# Patient Record
Sex: MALE | Race: WHITE | URBAN - METROPOLITAN AREA
[De-identification: names, ages, dates, MRNs, and addresses within clinical notes are randomized per-mention and may not be internally consistent; named-entity substitution may affect disease eponyms.]

---

## 2017-05-15 ENCOUNTER — HOSPITAL ENCOUNTER (INPATIENT)
Facility: CLINIC | Age: 67
LOS: 7 days | Discharge: HOME | DRG: 871 | End: 2017-05-22
Attending: FAMILY MEDICINE | Admitting: INTERNAL MEDICINE
Payer: MEDICARE

## 2017-05-15 ENCOUNTER — APPOINTMENT (OUTPATIENT)
Dept: GENERAL RADIOLOGY | Facility: CLINIC | Age: 67
DRG: 871 | End: 2017-05-15
Attending: FAMILY MEDICINE
Payer: MEDICARE

## 2017-05-15 DIAGNOSIS — E66.01 MORBID OBESITY, UNSPECIFIED OBESITY TYPE (H): ICD-10-CM

## 2017-05-15 DIAGNOSIS — R78.81 STREPTOCOCCAL BACTEREMIA: ICD-10-CM

## 2017-05-15 DIAGNOSIS — L03.115 CELLULITIS OF RIGHT LOWER EXTREMITY: ICD-10-CM

## 2017-05-15 DIAGNOSIS — B95.5 STREPTOCOCCAL BACTEREMIA: ICD-10-CM

## 2017-05-15 DIAGNOSIS — R65.20 SEVERE SEPSIS WITH ACUTE ORGAN DYSFUNCTION DUE TO STREPTOCOCCUS SPECIES (H): Primary | ICD-10-CM

## 2017-05-15 DIAGNOSIS — A41.9 BACTERIAL SEPSIS (H): ICD-10-CM

## 2017-05-15 DIAGNOSIS — I50.9 ACUTE ON CHRONIC CONGESTIVE HEART FAILURE, UNSPECIFIED CONGESTIVE HEART FAILURE TYPE: ICD-10-CM

## 2017-05-15 DIAGNOSIS — I48.91 ATRIAL FIBRILLATION WITH RAPID VENTRICULAR RESPONSE (H): ICD-10-CM

## 2017-05-15 DIAGNOSIS — A40.9 SEVERE SEPSIS WITH ACUTE ORGAN DYSFUNCTION DUE TO STREPTOCOCCUS SPECIES (H): Primary | ICD-10-CM

## 2017-05-15 PROBLEM — I50.42 CHRONIC COMBINED SYSTOLIC AND DIASTOLIC CONGESTIVE HEART FAILURE (H): Status: ACTIVE | Noted: 2017-05-15

## 2017-05-15 PROBLEM — I42.9 CARDIOMYOPATHY, UNSPECIFIED (H): Status: ACTIVE | Noted: 2017-05-15

## 2017-05-15 PROBLEM — I27.20 PULMONARY HYPERTENSION (H): Status: ACTIVE | Noted: 2017-05-15

## 2017-05-15 PROBLEM — G89.4 CHRONIC PAIN SYNDROME: Status: ACTIVE | Noted: 2017-05-15

## 2017-05-15 PROBLEM — L03.116 LEFT LEG CELLULITIS: Status: ACTIVE | Noted: 2017-05-15

## 2017-05-15 PROBLEM — J96.01 ACUTE RESPIRATORY FAILURE WITH HYPOXIA (H): Status: ACTIVE | Noted: 2017-05-15

## 2017-05-15 PROBLEM — E11.9 TYPE 2 DIABETES MELLITUS WITHOUT COMPLICATION (H): Status: ACTIVE | Noted: 2017-05-15

## 2017-05-15 PROBLEM — I10 ESSENTIAL HYPERTENSION: Status: ACTIVE | Noted: 2017-05-15

## 2017-05-15 PROBLEM — G93.40 ACUTE ENCEPHALOPATHY: Status: ACTIVE | Noted: 2017-05-15

## 2017-05-15 PROBLEM — G47.33 OSA (OBSTRUCTIVE SLEEP APNEA): Status: ACTIVE | Noted: 2017-05-15

## 2017-05-15 LAB
ALBUMIN SERPL-MCNC: 3.8 G/DL (ref 3.4–5)
ALBUMIN UR-MCNC: 30 MG/DL
ALP SERPL-CCNC: 111 U/L (ref 40–150)
ALT SERPL W P-5'-P-CCNC: 51 U/L (ref 0–70)
ANION GAP SERPL CALCULATED.3IONS-SCNC: 7 MMOL/L (ref 3–14)
APPEARANCE UR: CLEAR
AST SERPL W P-5'-P-CCNC: 40 U/L (ref 0–45)
BASE EXCESS BLDA CALC-SCNC: 1.1 MMOL/L
BASOPHILS # BLD AUTO: 0 10E9/L (ref 0–0.2)
BASOPHILS NFR BLD AUTO: 0.1 %
BILIRUB SERPL-MCNC: 0.8 MG/DL (ref 0.2–1.3)
BILIRUB UR QL STRIP: NEGATIVE
BUN SERPL-MCNC: 22 MG/DL (ref 7–30)
CALCIUM SERPL-MCNC: 8.8 MG/DL (ref 8.5–10.1)
CHLORIDE SERPL-SCNC: 103 MMOL/L (ref 94–109)
CO2 SERPL-SCNC: 30 MMOL/L (ref 20–32)
COLOR UR AUTO: YELLOW
CREAT SERPL-MCNC: 1.09 MG/DL (ref 0.66–1.25)
DIFFERENTIAL METHOD BLD: ABNORMAL
EOSINOPHIL # BLD AUTO: 0 10E9/L (ref 0–0.7)
EOSINOPHIL NFR BLD AUTO: 0 %
ERYTHROCYTE [DISTWIDTH] IN BLOOD BY AUTOMATED COUNT: 15.1 % (ref 10–15)
GFR SERPL CREATININE-BSD FRML MDRD: 68 ML/MIN/1.7M2
GLUCOSE BLDC GLUCOMTR-MCNC: 217 MG/DL (ref 70–99)
GLUCOSE SERPL-MCNC: 180 MG/DL (ref 70–99)
GLUCOSE UR STRIP-MCNC: 50 MG/DL
HCO3 BLD-SCNC: 25 MMOL/L (ref 21–28)
HCT VFR BLD AUTO: 49.1 % (ref 40–53)
HGB BLD-MCNC: 15.4 G/DL (ref 13.3–17.7)
HGB UR QL STRIP: ABNORMAL
IMM GRANULOCYTES # BLD: 0.1 10E9/L (ref 0–0.4)
IMM GRANULOCYTES NFR BLD: 0.5 %
INR PPP: 4.7 (ref 0.86–1.14)
KETONES UR STRIP-MCNC: NEGATIVE MG/DL
LACTATE BLD-SCNC: 2.1 MMOL/L (ref 0.7–2.1)
LACTATE BLD-SCNC: 3.1 MMOL/L (ref 0.7–2.1)
LACTATE BLD-SCNC: 3.3 MMOL/L (ref 0.7–2.1)
LEUKOCYTE ESTERASE UR QL STRIP: NEGATIVE
LYMPHOCYTES # BLD AUTO: 1.1 10E9/L (ref 0.8–5.3)
LYMPHOCYTES NFR BLD AUTO: 7.1 %
MCH RBC QN AUTO: 28.5 PG (ref 26.5–33)
MCHC RBC AUTO-ENTMCNC: 31.4 G/DL (ref 31.5–36.5)
MCV RBC AUTO: 91 FL (ref 78–100)
MONOCYTES # BLD AUTO: 1.5 10E9/L (ref 0–1.3)
MONOCYTES NFR BLD AUTO: 9.2 %
MUCOUS THREADS #/AREA URNS LPF: PRESENT /LPF
NEUTROPHILS # BLD AUTO: 13.4 10E9/L (ref 1.6–8.3)
NEUTROPHILS NFR BLD AUTO: 83.1 %
NITRATE UR QL: NEGATIVE
NT-PROBNP SERPL-MCNC: 2562 PG/ML (ref 0–900)
O2/TOTAL GAS SETTING VFR VENT: 75 %
OXYHGB MFR BLD: 99 % (ref 92–100)
PCO2 BLD: 35 MM HG (ref 35–45)
PH BLD: 7.46 PH (ref 7.35–7.45)
PH UR STRIP: 5 PH (ref 5–7)
PLATELET # BLD AUTO: 153 10E9/L (ref 150–450)
PO2 BLD: 174 MM HG (ref 80–105)
POTASSIUM SERPL-SCNC: 5 MMOL/L (ref 3.4–5.3)
PROT SERPL-MCNC: 7.5 G/DL (ref 6.8–8.8)
RBC # BLD AUTO: 5.4 10E12/L (ref 4.4–5.9)
RBC #/AREA URNS AUTO: 1 /HPF (ref 0–2)
SODIUM SERPL-SCNC: 140 MMOL/L (ref 133–144)
SP GR UR STRIP: 1.02 (ref 1–1.03)
URN SPEC COLLECT METH UR: ABNORMAL
UROBILINOGEN UR STRIP-MCNC: 0 MG/DL (ref 0–2)
WBC # BLD AUTO: 16.1 10E9/L (ref 4–11)
WBC #/AREA URNS AUTO: 0 /HPF (ref 0–2)

## 2017-05-15 PROCEDURE — 36415 COLL VENOUS BLD VENIPUNCTURE: CPT | Performed by: INTERNAL MEDICINE

## 2017-05-15 PROCEDURE — 87077 CULTURE AEROBIC IDENTIFY: CPT | Performed by: FAMILY MEDICINE

## 2017-05-15 PROCEDURE — 80053 COMPREHEN METABOLIC PANEL: CPT | Performed by: FAMILY MEDICINE

## 2017-05-15 PROCEDURE — 87040 BLOOD CULTURE FOR BACTERIA: CPT | Performed by: FAMILY MEDICINE

## 2017-05-15 PROCEDURE — 83605 ASSAY OF LACTIC ACID: CPT | Performed by: INTERNAL MEDICINE

## 2017-05-15 PROCEDURE — 81001 URINALYSIS AUTO W/SCOPE: CPT | Performed by: FAMILY MEDICINE

## 2017-05-15 PROCEDURE — 25000125 ZZHC RX 250

## 2017-05-15 PROCEDURE — 20000003 ZZH R&B ICU

## 2017-05-15 PROCEDURE — 71010 XR CHEST PORT 1 VW: CPT | Mod: TC

## 2017-05-15 PROCEDURE — 36600 WITHDRAWAL OF ARTERIAL BLOOD: CPT

## 2017-05-15 PROCEDURE — 87147 CULTURE TYPE IMMUNOLOGIC: CPT | Performed by: FAMILY MEDICINE

## 2017-05-15 PROCEDURE — 85025 COMPLETE CBC W/AUTO DIFF WBC: CPT | Performed by: FAMILY MEDICINE

## 2017-05-15 PROCEDURE — 25000128 H RX IP 250 OP 636: Performed by: FAMILY MEDICINE

## 2017-05-15 PROCEDURE — 85610 PROTHROMBIN TIME: CPT | Performed by: FAMILY MEDICINE

## 2017-05-15 PROCEDURE — 36415 COLL VENOUS BLD VENIPUNCTURE: CPT | Performed by: FAMILY MEDICINE

## 2017-05-15 PROCEDURE — 25000128 H RX IP 250 OP 636: Performed by: INTERNAL MEDICINE

## 2017-05-15 PROCEDURE — 25000125 ZZHC RX 250: Performed by: FAMILY MEDICINE

## 2017-05-15 PROCEDURE — 00000146 ZZHCL STATISTIC GLUCOSE BY METER IP

## 2017-05-15 PROCEDURE — 87186 SC STD MICRODIL/AGAR DIL: CPT | Performed by: FAMILY MEDICINE

## 2017-05-15 PROCEDURE — 99223 1ST HOSP IP/OBS HIGH 75: CPT | Mod: AI | Performed by: INTERNAL MEDICINE

## 2017-05-15 PROCEDURE — 40000275 ZZH STATISTIC RCP TIME EA 10 MIN

## 2017-05-15 PROCEDURE — 83605 ASSAY OF LACTIC ACID: CPT | Performed by: FAMILY MEDICINE

## 2017-05-15 PROCEDURE — 87081 CULTURE SCREEN ONLY: CPT | Performed by: INTERNAL MEDICINE

## 2017-05-15 PROCEDURE — 87800 DETECT AGNT MULT DNA DIREC: CPT | Performed by: FAMILY MEDICINE

## 2017-05-15 PROCEDURE — 99207 ZZC MOONLIGHTING INDICATOR: CPT | Performed by: INTERNAL MEDICINE

## 2017-05-15 PROCEDURE — 94660 CPAP INITIATION&MGMT: CPT

## 2017-05-15 PROCEDURE — 87086 URINE CULTURE/COLONY COUNT: CPT | Performed by: FAMILY MEDICINE

## 2017-05-15 PROCEDURE — 82805 BLOOD GASES W/O2 SATURATION: CPT | Performed by: FAMILY MEDICINE

## 2017-05-15 PROCEDURE — 83880 ASSAY OF NATRIURETIC PEPTIDE: CPT | Performed by: FAMILY MEDICINE

## 2017-05-15 PROCEDURE — 25000128 H RX IP 250 OP 636

## 2017-05-15 RX ORDER — ONDANSETRON 2 MG/ML
4 INJECTION INTRAMUSCULAR; INTRAVENOUS EVERY 6 HOURS PRN
Status: DISCONTINUED | OUTPATIENT
Start: 2017-05-15 | End: 2017-05-22 | Stop reason: HOSPADM

## 2017-05-15 RX ORDER — DILTIAZEM HYDROCHLORIDE 5 MG/ML
INJECTION INTRAVENOUS
Status: COMPLETED
Start: 2017-05-15 | End: 2017-05-15

## 2017-05-15 RX ORDER — SODIUM CHLORIDE 9 MG/ML
INJECTION, SOLUTION INTRAVENOUS CONTINUOUS
Status: DISCONTINUED | OUTPATIENT
Start: 2017-05-15 | End: 2017-05-18

## 2017-05-15 RX ORDER — POLYETHYLENE GLYCOL 3350 17 G/17G
17 POWDER, FOR SOLUTION ORAL DAILY PRN
Status: DISCONTINUED | OUTPATIENT
Start: 2017-05-15 | End: 2017-05-22 | Stop reason: HOSPADM

## 2017-05-15 RX ORDER — ASPIRIN 81 MG/1
81 TABLET, CHEWABLE ORAL DAILY
Status: DISCONTINUED | OUTPATIENT
Start: 2017-05-16 | End: 2017-05-22 | Stop reason: HOSPADM

## 2017-05-15 RX ORDER — NICOTINE POLACRILEX 4 MG
15-30 LOZENGE BUCCAL
Status: DISCONTINUED | OUTPATIENT
Start: 2017-05-15 | End: 2017-05-22 | Stop reason: HOSPADM

## 2017-05-15 RX ORDER — ACETAMINOPHEN 500 MG
1000 TABLET ORAL EVERY 4 HOURS PRN
Status: DISCONTINUED | OUTPATIENT
Start: 2017-05-15 | End: 2017-05-15

## 2017-05-15 RX ORDER — HYDROCODONE BITARTRATE AND ACETAMINOPHEN 5; 325 MG/1; MG/1
2 TABLET ORAL EVERY 6 HOURS PRN
Status: ON HOLD | COMMUNITY
End: 2017-05-22

## 2017-05-15 RX ORDER — ACETAMINOPHEN 10 MG/ML
1000 INJECTION, SOLUTION INTRAVENOUS EVERY 6 HOURS PRN
Status: DISCONTINUED | OUTPATIENT
Start: 2017-05-15 | End: 2017-05-15

## 2017-05-15 RX ORDER — PROCHLORPERAZINE 25 MG
12.5 SUPPOSITORY, RECTAL RECTAL EVERY 12 HOURS PRN
Status: DISCONTINUED | OUTPATIENT
Start: 2017-05-15 | End: 2017-05-22 | Stop reason: HOSPADM

## 2017-05-15 RX ORDER — SODIUM CHLORIDE 9 MG/ML
100 INJECTION, SOLUTION INTRAVENOUS CONTINUOUS
Status: DISCONTINUED | OUTPATIENT
Start: 2017-05-15 | End: 2017-05-15

## 2017-05-15 RX ORDER — HYDROCODONE BITARTRATE AND ACETAMINOPHEN 5; 325 MG/1; MG/1
2 TABLET ORAL EVERY 6 HOURS PRN
Status: DISCONTINUED | OUTPATIENT
Start: 2017-05-15 | End: 2017-05-20

## 2017-05-15 RX ORDER — ACETAMINOPHEN 325 MG/1
650 TABLET ORAL EVERY 4 HOURS PRN
Status: DISCONTINUED | OUTPATIENT
Start: 2017-05-15 | End: 2017-05-22 | Stop reason: HOSPADM

## 2017-05-15 RX ORDER — NALOXONE HYDROCHLORIDE 0.4 MG/ML
.1-.4 INJECTION, SOLUTION INTRAMUSCULAR; INTRAVENOUS; SUBCUTANEOUS
Status: DISCONTINUED | OUTPATIENT
Start: 2017-05-15 | End: 2017-05-22 | Stop reason: HOSPADM

## 2017-05-15 RX ORDER — DILTIAZEM HYDROCHLORIDE 5 MG/ML
25 INJECTION INTRAVENOUS ONCE
Status: COMPLETED | OUTPATIENT
Start: 2017-05-15 | End: 2017-05-15

## 2017-05-15 RX ORDER — KETOROLAC TROMETHAMINE 15 MG/ML
15 INJECTION, SOLUTION INTRAMUSCULAR; INTRAVENOUS EVERY 6 HOURS PRN
Status: DISCONTINUED | OUTPATIENT
Start: 2017-05-15 | End: 2017-05-15

## 2017-05-15 RX ORDER — FENTANYL CITRATE 50 UG/ML
75 INJECTION, SOLUTION INTRAMUSCULAR; INTRAVENOUS
Status: DISCONTINUED | OUTPATIENT
Start: 2017-05-15 | End: 2017-05-15

## 2017-05-15 RX ORDER — ONDANSETRON 4 MG/1
4 TABLET, ORALLY DISINTEGRATING ORAL EVERY 6 HOURS PRN
Status: DISCONTINUED | OUTPATIENT
Start: 2017-05-15 | End: 2017-05-22 | Stop reason: HOSPADM

## 2017-05-15 RX ORDER — PROCHLORPERAZINE MALEATE 5 MG
5 TABLET ORAL EVERY 6 HOURS PRN
Status: DISCONTINUED | OUTPATIENT
Start: 2017-05-15 | End: 2017-05-22 | Stop reason: HOSPADM

## 2017-05-15 RX ORDER — LIDOCAINE 40 MG/G
CREAM TOPICAL
Status: DISCONTINUED | OUTPATIENT
Start: 2017-05-15 | End: 2017-05-15

## 2017-05-15 RX ORDER — FUROSEMIDE 10 MG/ML
40 INJECTION INTRAMUSCULAR; INTRAVENOUS ONCE
Status: COMPLETED | OUTPATIENT
Start: 2017-05-15 | End: 2017-05-15

## 2017-05-15 RX ORDER — DEXTROSE MONOHYDRATE 25 G/50ML
25-50 INJECTION, SOLUTION INTRAVENOUS
Status: DISCONTINUED | OUTPATIENT
Start: 2017-05-15 | End: 2017-05-22 | Stop reason: HOSPADM

## 2017-05-15 RX ORDER — DILTIAZEM HYDROCHLORIDE 5 MG/ML
25 INJECTION INTRAVENOUS ONCE
Status: DISCONTINUED | OUTPATIENT
Start: 2017-05-15 | End: 2017-05-15

## 2017-05-15 RX ADMIN — ACETAMINOPHEN 1000 MG: 10 INJECTION, SOLUTION INTRAVENOUS at 15:34

## 2017-05-15 RX ADMIN — VANCOMYCIN HYDROCHLORIDE 2500 MG: 1 INJECTION, POWDER, LYOPHILIZED, FOR SOLUTION INTRAVENOUS at 14:19

## 2017-05-15 RX ADMIN — SODIUM CHLORIDE: 9 INJECTION, SOLUTION INTRAVENOUS at 19:44

## 2017-05-15 RX ADMIN — SODIUM CHLORIDE 2000 ML: 9 INJECTION, SOLUTION INTRAVENOUS at 13:16

## 2017-05-15 RX ADMIN — DILTIAZEM HYDROCHLORIDE 25 MG: 5 INJECTION INTRAVENOUS at 13:03

## 2017-05-15 RX ADMIN — FENTANYL CITRATE 75 MCG: 50 INJECTION, SOLUTION INTRAMUSCULAR; INTRAVENOUS at 15:19

## 2017-05-15 RX ADMIN — SODIUM CHLORIDE 1000 ML: 9 INJECTION, SOLUTION INTRAVENOUS at 19:44

## 2017-05-15 RX ADMIN — FENTANYL CITRATE 75 MCG: 50 INJECTION, SOLUTION INTRAMUSCULAR; INTRAVENOUS at 13:54

## 2017-05-15 RX ADMIN — TAZOBACTAM SODIUM AND PIPERACILLIN SODIUM 4.5 G: 500; 4 INJECTION, SOLUTION INTRAVENOUS at 21:26

## 2017-05-15 RX ADMIN — KETOROLAC TROMETHAMINE 15 MG: 15 INJECTION, SOLUTION INTRAMUSCULAR; INTRAVENOUS at 15:51

## 2017-05-15 RX ADMIN — FUROSEMIDE 40 MG: 10 INJECTION, SOLUTION INTRAVENOUS at 14:19

## 2017-05-15 RX ADMIN — FENTANYL CITRATE 75 MCG: 50 INJECTION, SOLUTION INTRAMUSCULAR; INTRAVENOUS at 13:06

## 2017-05-15 RX ADMIN — SODIUM CHLORIDE 100 MG: 9 INJECTION, SOLUTION INTRAVENOUS at 19:56

## 2017-05-15 RX ADMIN — SODIUM CHLORIDE 1000 ML: 9 INJECTION, SOLUTION INTRAVENOUS at 12:43

## 2017-05-15 RX ADMIN — SODIUM CHLORIDE 1000 ML: 9 INJECTION, SOLUTION INTRAVENOUS at 15:28

## 2017-05-15 RX ADMIN — TAZOBACTAM SODIUM AND PIPERACILLIN SODIUM 4.5 G: 500; 4 INJECTION, SOLUTION INTRAVENOUS at 13:54

## 2017-05-15 NOTE — PHARMACY-VANCOMYCIN DOSING SERVICE
Pharmacy Vancomycin Initial Note  Date of Service May 15, 2017  Patient's  1950  66 year old, male    Indication: Sepsis    Current estimated CrCl = Estimated Creatinine Clearance: 94.8 mL/min (based on Cr of 1.09).    Creatinine for last 3 days  5/15/2017: 12:50 PM Creatinine 1.09 mg/dL    Recent Vancomycin Level(s) for last 3 days  No results found for requested labs within last 72 hours.      Vancomycin IV Administrations (past 72 hours)      No vancomycin orders with administrations in past 72 hours.                Nephrotoxins and other renal medications (Future)    Start     Dose/Rate Route Frequency Ordered Stop    05/15/17 1400  vancomycin (VANCOCIN) 2,500 mg in NaCl 0.9 % 500 mL intermittent infusion      2,500 mg  166.7 mL/hr over 180 Minutes Intravenous EVERY 12 HOURS 05/15/17 1357      05/15/17 1341  piperacillin-tazobactam (ZOSYN) intermittent infusion 4.5 g      4.5 g  200 mL/hr over 30 Minutes Intravenous ONCE 05/15/17 1340            Contrast Orders - past 72 hours     None                Plan:  1.  Start vancomycin  2500 mg IV q12h.   2.  Goal Trough Level: 15-20 mg/L   3.  Pharmacy will check trough levels as appropriate in 1-3 Days.    4. Serum creatinine levels will be ordered daily for the first week of therapy and at least twice weekly for subsequent weeks.    5. Lequire method utilized to dose vancomycin therapy: Method 2    Leelee Lopez, YennyD

## 2017-05-15 NOTE — ED NOTES
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 Pt wasn't feeling well at work CNS black topping. Bystanders alerted police that he was weaving all over the road. Pt went to [employer] then police met him. EMS called. RT was called. Bipap placed for tachypnea and low sats of 80 percent. Pt warm to touch. Note right  leg reddened and swollen. 's 130's a.fib. Cardizem given. HR now 70's to 80's. Pt is in A.fib at all times. Pt given fentanyl for agitation. Pt is following commands at all times. Pt confused initially.Did not remember driving today. Temp 103.3 Temporal. BP elevated significantly at triage. Now normal. RT managing bi pap. Palomino in place. Family here. Pt primary MD at [place]. Pt has history of hypertension.

## 2017-05-15 NOTE — IP AVS SNAPSHOT
[address] [facility]    [address] [facility]     [place] [address]    Phone:  [phone]                                       After Visit Summary   5/15/2017    Last Phelps    MRN: [id number]           After Visit Summary Signature Page     I have received my discharge instructions, and my questions have been answered. I have discussed any challenges I see with this plan with the nurse or doctor.    ..........................................................................................................................................  Patient/Patient Representative Signature      ..........................................................................................................................................  Patient Representative Print Name and Relationship to Patient    ..................................................               ................................................  Date                                            Time    ..........................................................................................................................................  Reviewed by Signature/Title    ...................................................              ..............................................  Date                                                            Time

## 2017-05-15 NOTE — ED PROVIDER NOTES
"  History     Chief Complaint   Patient presents with     Altered Mental Status     Shortness of breath. Driving erratic. Police pulled over.      HPI  Last Phelps is a 66 year old male arrived by ambulance.  He was pulled over by law enforcement here in [place] for erratic driving.  He doesn't remember driving or any recent events.  The patient is unable to give me any history.  He is confused.     Per EMS: EMS arrived and he had oxygen saturation of 85% on room air and had an increased respiratory rate.  His heart rate was 120 bpm.  Blood sugar was 215 and the patient denies a history of diabetes.  He stated that he did have recent heart surgery but was unable to tell them what that was.  He had a short trip to the emergency department and they had him on oxygen at 3 L by nasal cannula with oxygen saturations of around 90-92%.    I was able to speak to his wife on the phone.  The patient does have persistent lower extremity edema, right more than left.  She has not noticed any redness of his lower legs.  He has no history of blood clot.  He currently is on Coumadin for atrial fibrillation and recently had his INR checked and they were told that that was okay.  He has had a cardiac ablation but they did not give him a pacemaker.  He has no history of MI.  Patient has no known history of lung disease and is not a smoker.    I have reviewed the Medications, Allergies, Past Medical and Surgical History, and Social History in the Epic system.    Review of Systems   Unable to perform ROS: Acuity of condition       Physical Exam   BP: (!) 139/109  Pulse: 125  Heart Rate: 112  Temp: 103.3  F (39.6  C)  Resp: 24  Height: 185.4 cm (6' 1\")  Weight: 131.5 kg (290 lb)  SpO2: 91 %    Physical Exam   Constitutional: He appears distressed.   Morbidly obese 66-year-old male in severe respiratory distress   HENT:   Head: Normocephalic and atraumatic.   Right Ear: External ear normal.   Left Ear: External ear normal.   Eyes: " Conjunctivae and EOM are normal.   Neck: Normal range of motion. Neck supple. No JVD present.   Cardiovascular:   Elevated heart rate with irregularly irregular rhythm   Pulmonary/Chest: He is in respiratory distress. He has wheezes. He has rales.   Significantly increased work of breathing with tachypnea   Abdominal: Soft. Bowel sounds are normal. He exhibits distension. There is no tenderness. There is no rebound and no guarding.   Genitourinary: Penis normal.   Genitourinary Comments: Palomino passed without difficulty   Musculoskeletal: He exhibits edema. He exhibits no tenderness or deformity.   Lymphadenopathy:     He has no cervical adenopathy.   Neurological:   Confused and unable to answer questions reliably   Skin:   Significant erythema and warmth of the right lower extremity       ED Course  Last arrived in significant respiratory distress.  The patient is unable to give me any history.  He is confused.  He does appear to remain conscious but has low oxygen saturations.  He has significant erythema and swelling of the right lower leg.  We moved him to an oxygen mask at 1235 because of oxygen saturations of 87% on nasal cannula.  His rhythm shows atrial fibrillation with a rate in the 120s.  We then moved him to BiPAP with settings of 18/10/73% at about 12:50 PM.  The patient was given 25 mg of diltiazem at 1300.  Palomino was placed at 1301 and had return of urine.  Labs were drawn and results are pending.    1:43 PM  Last is more stable.  At this point he is on BiPAP with settings of 18/10/73%.  His vital signs right now show tachypnea with a respiratory rate of about 40.  Oxygen saturation is 99%.  Heart rate is 94.  Last blood pressure at 1345 was 121/104.   I did review the chest x-ray and he appears to have significant congestion.  Pneumonia is certainly a possibility.  At this point we started Zosyn and vancomycin for infection of unknown source.  It seems most likely that he has sepsis associated with  right lower extremity cellulitis.    2:58 PM  His BNP is mildly elevated at 2562.  His EKG shows atrial fibrillation with nonspecific ST segment changes.  Chest x-ray shows congestion.  We did give him a dose of Lasix here in the ED and currently his heart rate is 105 bpm with atrial fibrillation, respiratory rate is 31, oxygen saturation is 96% and blood pressure is 126/70.  His ventilator settings are 16/10/50% and he has a tidal volume of 439 mL.  I think he has sepsis from his cellulitis and we do have antibiotics started.  The patient appears much more stable clinically.       ED Course     Procedures             EKG Interpretation:      Interpreted by Tremaine Omalley  Time reviewed: 1245  Symptoms at time of EKG: shortness of breath and tachycardia   Rhythm: atrial fibrillation - rapid  Rate: 120 bpm  Axis: Normal  Ectopy: none  Conduction: normal  ST Segments/ T Waves: Non-specific ST-T wave changes  Q Waves: none  Comparison to prior: Unchanged    Clinical Impression: Atrial fibrillation with RVR and nonspecific ST segment changes.        Critical Care time:  was 60 minutes for this patient excluding procedures.  The patient has signs of Severe Sepsis as evidenced by:    1. 2 SIRS criteria, AND  2. Suspected infection, AND   3. Organ dysfunction: Lactic Acid >2    Time severe sepsis diagnosis confirmed = 1315 as this was the time when Lactate resulted, and the level was >2      3 Hour Severe Sepsis Bundle Completion:  1. Initial Lactic Acid Result:   Recent Labs   Lab Test  05/15/17   1508  05/15/17   1250   LACT  3.3*  3.1*     2. Blood Cultures before Antibiotics: Yes  3. Broad Spectrum Antibiotics Administered: Yes     Anti-infectives (Future)    Start     Dose/Rate Route Frequency Ordered Stop    05/15/17 1400  vancomycin (VANCOCIN) 2,500 mg in NaCl 0.9 % 500 mL intermittent infusion      2,500 mg  166.7 mL/hr over 180 Minutes Intravenous EVERY 12 HOURS 05/15/17 1357          4. 3000 ml of IV  fluids.    the patient was transferred out of the ED prior to the 6 hour moira.        Results for orders placed or performed during the hospital encounter of 05/15/17 (from the past 24 hour(s))   Comprehensive metabolic panel   Result Value Ref Range    Sodium 140 133 - 144 mmol/L    Potassium 5.0 3.4 - 5.3 mmol/L    Chloride 103 94 - 109 mmol/L    Carbon Dioxide 30 20 - 32 mmol/L    Anion Gap 7 3 - 14 mmol/L    Glucose 180 (H) 70 - 99 mg/dL    Urea Nitrogen 22 7 - 30 mg/dL    Creatinine 1.09 0.66 - 1.25 mg/dL    GFR Estimate 68 >60 mL/min/1.7m2    GFR Estimate If Black 82 >60 mL/min/1.7m2    Calcium 8.8 8.5 - 10.1 mg/dL    Bilirubin Total 0.8 0.2 - 1.3 mg/dL    Albumin 3.8 3.4 - 5.0 g/dL    Protein Total 7.5 6.8 - 8.8 g/dL    Alkaline Phosphatase 111 40 - 150 U/L    ALT 51 0 - 70 U/L    AST 40 0 - 45 U/L   CBC with platelets differential   Result Value Ref Range    WBC 16.1 (H) 4.0 - 11.0 10e9/L    RBC Count 5.40 4.4 - 5.9 10e12/L    Hemoglobin 15.4 13.3 - 17.7 g/dL    Hematocrit 49.1 40.0 - 53.0 %    MCV 91 78 - 100 fl    MCH 28.5 26.5 - 33.0 pg    MCHC 31.4 (L) 31.5 - 36.5 g/dL    RDW 15.1 (H) 10.0 - 15.0 %    Platelet Count 153 150 - 450 10e9/L    Diff Method Automated Method     % Neutrophils 83.1 %    % Lymphocytes 7.1 %    % Monocytes 9.2 %    % Eosinophils 0.0 %    % Basophils 0.1 %    % Immature Granulocytes 0.5 %    Absolute Neutrophil 13.4 (H) 1.6 - 8.3 10e9/L    Absolute Lymphocytes 1.1 0.8 - 5.3 10e9/L    Absolute Monocytes 1.5 (H) 0.0 - 1.3 10e9/L    Absolute Eosinophils 0.0 0.0 - 0.7 10e9/L    Absolute Basophils 0.0 0.0 - 0.2 10e9/L    Abs Immature Granulocytes 0.1 0 - 0.4 10e9/L   Lactic acid whole blood   Result Value Ref Range    Lactic Acid 3.1 (H) 0.7 - 2.1 mmol/L   INR   Result Value Ref Range    INR 4.70 (H) 0.86 - 1.14   Blood gas arterial and oxyhgb   Result Value Ref Range    pH Arterial 7.46 (H) 7.35 - 7.45 pH    pCO2 Arterial 35 35 - 45 mm Hg    pO2 Arterial 174 (H) 80 - 105 mm Hg     Bicarbonate Arterial 25 21 - 28 mmol/L    FIO2 75     Oxyhemoglobin Arterial 99 92 - 100 %    Base Excess Art 1.1 mmol/L   NT pro BNP   Result Value Ref Range    N-Terminal Pro BNP Inpatient 2562 (H) 0 - 900 pg/mL   UA with Microscopic   Result Value Ref Range    Color Urine Yellow     Appearance Urine Clear     Glucose Urine 50 (A) NEG mg/dL    Bilirubin Urine Negative NEG    Ketones Urine Negative NEG mg/dL    Specific Gravity Urine 1.016 1.003 - 1.035    Blood Urine Small (A) NEG    pH Urine 5.0 5.0 - 7.0 pH    Protein Albumin Urine 30 (A) NEG mg/dL    Urobilinogen mg/dL 0.0 0.0 - 2.0 mg/dL    Nitrite Urine Negative NEG    Leukocyte Esterase Urine Negative NEG    Source Catheterized Urine     WBC Urine 0 0 - 2 /HPF    RBC Urine 1 0 - 2 /HPF    Mucous Urine Present (A) NEG /LPF   XR Chest Port 1 View    Narrative    CHEST ONE VIEW PORTABLE  5/15/2017 1:39 PM     HISTORY: Hypoxia, decreased level of consciousness.    COMPARISON: None.    FINDINGS:  Lungs are hypoaerated. Increased interstitial markings  likely represent vascular crowding and/or atelectasis. Vascular  congestion is considered less likely. Tubular structure projected over  the left upper chest likely represents the breathing apparatus. No  pneumothorax or significant pleural fluid collections are identified.  No fracture is seen.      Impression    IMPRESSION:  1. Hypoaeration and pulmonary vascular crowding.  2. No other evidence of acute cardiopulmonary disease is seen.    FAYE BOWERS MD   Lactic acid whole blood   Result Value Ref Range    Lactic Acid 3.3 (H) 0.7 - 2.1 mmol/L       Medications   lidocaine 1 % 1 mL (not administered)   lidocaine (LMX4) kit (not administered)   sodium chloride (PF) 0.9% PF flush 3 mL (not administered)   sodium chloride (PF) 0.9% PF flush 3 mL ( Intravenous Canceled Entry 5/15/17 1645)   fentaNYL Citrate (PF) (SUBLIMAZE) injection 75 mcg (75 mcg Intravenous Given 5/15/17 3799)   No lozenges or gum should be given  while patient on BIPAP (not administered)   hypromellose-dextran (ARTIFICAL TEARS) ophthalmic solution 1 drop (not administered)   HOLD: All Oral Medications (not administered)   vancomycin (VANCOCIN) 2,500 mg in NaCl 0.9 % 500 mL intermittent infusion (0 mg Intravenous Stopped 5/15/17 1758)   ketorolac (TORADOL) injection 15 mg (15 mg Intravenous Given 5/15/17 1551)   acetaminophen (OFIRMEV) infusion 1,000 mg (0 mg Intravenous Stopped 5/15/17 1551)   acetaminophen (TYLENOL) tablet 1,000 mg (not administered)   0.9% sodium chloride infusion (1,000 mLs Intravenous New Bag 5/15/17 1528)   0.9% sodium chloride BOLUS (0 mLs Intravenous Stopped 5/15/17 1423)   diltiazem (CARDIZEM) injection 25 mg (25 mg Intravenous Given 5/15/17 1303)   0.9% sodium chloride BOLUS (0 mLs Intravenous Stopped 5/15/17 1355)   piperacillin-tazobactam (ZOSYN) intermittent infusion 4.5 g (0 g Intravenous Stopped 5/15/17 1439)   furosemide (LASIX) injection 40 mg (40 mg Intravenous Given 5/15/17 1419)           Assessments & Plan (with Medical Decision Making)  Last is a 67 yo male brought in with significant respiratory distress.  He was initially stopped while enforcement here in [place] for driving erratically.  He was noted to be significantly short of breath and the ambulance crew found oxygen saturations below 90%.  He was started on 2 L and brought to the hospital.  On arrival we switched him over to an oxygen mask and he continued to have low oxygen saturations.  It was difficult to obtain IV access.  We switch the patient over to BiPAP and he had some stabilization of his respiratory status.  Initial vital signs showed hypoxia, elevated blood pressure and tachypnea.  He had an atrial fibrillation with rapid ventricular response with heart rate in the 120s to 140s on arrival.  One dose of dose diltiazem 25 mg brought his heart rate down to normal rate.  Labs show evidence of sepsis and he has a significant cellulitis.  Chest x-ray  shows congestion.  He has mild elevation of his white count.  The patient has been taking Coumadin and his INR is greater than 4, so we did not do a CT scan of his chest to look for PE.  I think the patient has sepsis from his cellulitis and we did start him on broad-spectrum antibiotics in the emergency department.  His BNP came back elevated we gave him 40 mg of Lasix IV, which helped to normalize his blood pressure.  Currently, at the time and recommended admission orders, his heart rate is 105 bpm with atrial fibrillation, respiratory rate is 31, oxygen saturation is 96% and blood pressure is 126/70.  His ventilator settings are 16/10/50% and he has a tidal volume of 439 mL.  The patient appears much more stable clinically.  I did speak to Dr. Flores about this patient.  He will be a full admission to the ICU and I did write the orders.      I have reviewed the nursing notes.    I have reviewed the findings, diagnosis, plan and need for follow up with the patient.    Current Discharge Medication List          Final diagnoses:   Bacterial sepsis (H)   Cellulitis of right lower extremity   Atrial fibrillation with rapid ventricular response (H)   Acute on chronic congestive heart failure, unspecified congestive heart failure type (H)   Morbid obesity, unspecified obesity type (H)       5/15/2017   [facility] EMERGENCY DEPARTMENT     Tremaine Omalely MD  05/15/17 2832

## 2017-05-15 NOTE — ED NOTES
"Pt more alert. Talking. Stating he has to \"pee.\" Catheter intact. Told pt he does have a catheter. Temp down. Moving in bed. RT and EDT to take pt upstairs.   "

## 2017-05-15 NOTE — ED NOTES
Pt temp elevated 103.1 temporal. MD notified. IV tylenol infusing. Pt given fentanyl for restlessness. 2550 urine output. 's RR 29. 02 sats stable. On bipap. RT managing.

## 2017-05-15 NOTE — IP AVS SNAPSHOT
MRN:[id number]                      After Visit Summary   5/15/2017    Last Phelps    MRN: [id number]           Thank you!     Thank you for choosing [facility] for your care. Our goal is always to provide you with excellent care. Hearing back from our patients is one way we can continue to improve our services. Please take a few minutes to complete the written survey that you may receive in the mail after you visit with us. Thank you!        Patient Information     Date Of Birth          1950        Designated Caregiver       Most Recent Value    Caregiver    Will someone help with your care after discharge? yes    Name of designated caregiver Pili wife    Phone number of caregiver home phone number [phone]    Caregiver address [address] [place], [place] [id number]      About your hospital stay     You were admitted on:  May 15, 2017 You last received care in the:  [address]    You were discharged on:  May 22, 2017        Reason for your hospital stay       Hospitalized for severe infection (sepsis due to skin infection in right leg) and improved                  Who to Call     For medical emergencies, please call 911.  For non-urgent questions about your medical care, please call your primary care provider or clinic, [phone]          Attending Provider     Provider Specialty    Tremaine Omalley MD Family Practice    Bird Schmitt MD Internal Medicine       Primary Care Provider Office Phone # Fax #    Christofer TOVAR Marcio [phone]       [facility] [address] [address]        After Care Instructions     Activity       Your activity upon discharge: activity as tolerated and no driving or operating machinery while on narcotic analgesics (hydrocodone)            Diet       Follow this diet upon discharge: Orders Placed This Encounter      Moderate Consistent CHO Diet            IV access       **Ordering Provider  MUST call/page Care Coordinator/ to discuss arranging this service**    You are going home with the following vascular access device: PICC.            Monitor and record       blood glucose according to your usual home routine            Wound care and dressings       Instructions to care for your legs at home: daily ACE bandage wraps.                  Follow-up Appointments     Follow-up and recommended labs and tests        Follow up with primary care provider, AVI SIDDIQUI, within 7 days for hospital follow- up.  The following labs/tests are recommended: WBC, CRP.                  Your next 10 appointments already scheduled     May 23, 2017  9:30 AM CDT   Level 1 with NL INFUSION CHAIR 2   [facility] ([facility])    [address] [place] [address]   [phone]            May 24, 2017 10:00 AM CDT   Level 1 with NL INFUSION CHAIR 4   [facility] ([facility])    [address] [place] [address]   [phone]            May 25, 2017  8:30 AM CDT   Level 1 with NL INFUSION CHAIR 5   [facility] ([facility])    [address] [place] [address]   [phone]            May 26, 2017  9:00 AM CDT   Level 1 with NL INFUSION CHAIR 2   [facility] ([facility])    [address] [place] [address]   [phone]              Further instructions from your care team       Appointment has been made with Dr. Siddiqui on Friday May 26th 11:00am    Warfarin Instruction     You have started taking a medicine called warfarin. This is a blood-thinning medicine (anticoagulant). It helps prevent and treat blood clots.      Before leaving the hospital, make sure you know how much to take and how long to take it.      You will need regular blood tests to make sure your blood is clotting safely. It is very important to see  "your doctor for regular blood tests.    Talk to your doctor before taking any new medicine (this includes over-the-counter drugs and herbal products). Many medicines can interact with warfarin. This may cause more bleeding or too much clotting.     Eating a lot of vitamin K--found in green, leafy vegetables--can change the way warfarin works in your body. Do NOT avoid these foods. Instead, try to eat the same amount each day.     Bleeding is the most common side-effect of warfarin. You may notice bleeding gums, a bloody nose, bruises and bleeding longer when you cut yourself. See a doctor at once if:   o You cough up blood  o You find blood in your stool (poop)  o You have a deep cut, or a cut that bleeds longer than 10 minutes   o You have a bad cut, hard fall, accident or hit your head (go to urgent care or the emergency room).    For women who can get pregnant: This medicine can harm an unborn baby. Be very careful not to get pregnant while taking this medicine. If you think you might be pregnant, call your doctor right away.    For more information, read \"Guide to Warfarin Therapy,  the booklet you received in the hospital.        Pending Results     Date and Time Order Name Status Description    5/22/2017 1719 XR Chest 1 View In process     5/22/2017 1654 XR Chest 1 View In process     5/20/2017 1042 Blood culture Preliminary             Statement of Approval     Ordered          05/22/17 1057  I have reviewed and agree with all the recommendations and orders detailed in this document.  EFFECTIVE NOW     Approved and electronically signed by:  Bird Schmitt MD             Admission Information     Date & Time Provider Department Dept. Phone    5/15/2017 Bird Schmitt MD [address] [facility] [phone]      Your Vitals Were     Blood Pressure Pulse Temperature Respirations Height Weight    119/81 89 96.6  F (35.9  C) (Oral) 20 1.854 m (6' 1\") 155 kg (341 lb 11.4 oz)    Pulse Oximetry " "BMI (Body Mass Index)                92% 45.08 kg/m2          [employer] Information     [employer] lets you send messages to your doctor, view your test results, renew your prescriptions, schedule appointments and more. To sign up, go to [email or web address] . Click on \"Log in\" on the left side of the screen, which will take you to the Welcome page. Then click on \"Sign up Now\" on the right side of the page.     You will be asked to enter the access code listed below, as well as some personal information. Please follow the directions to create your username and password.     Your access code is: [email or web address]  Expires: 2017 11:35 AM     Your access code will  in 90 days. If you need help or a new code, please call your [facility] clinic or [phone].        Care EveryWhere ID     This is your Care EveryWhere ID. This could be used by other organizations to access your [facility] medical records  [id number]           Review of your medicines      START taking        Dose / Directions    cefTRIAXone 2 GM vial   Commonly known as:  ROCEPHIN   Indication:  Skin and Soft Tissue Infection   Used for:  Cellulitis of right lower extremity        Dose:  2 g   Inject 2 g into the vein every 24 hours   Quantity:  140 mL   Refills:  0         CONTINUE these medicines which may have CHANGED, or have new prescriptions. If we are uncertain of the size of tablets/capsules you have at home, strength may be listed as something that might have changed.        Dose / Directions    furosemide 20 MG tablet   Commonly known as:  LASIX   This may have changed:  when to take this        Dose:  20 mg   Take 1 tablet (20 mg) by mouth 2 times daily   Quantity:  30 tablet   Refills:  0       warfarin 5 MG tablet   Commonly known as:  COUMADIN   This may have changed:  See the new instructions.        No dose on 17, then take by mouth 2.5 mg (1/2 tablet) on  and 5 mg (1 tablet) all other days or as directed by " anticoagulation clinic RN   Quantity:  30 tablet   Refills:  0         CONTINUE these medicines which have NOT CHANGED        Dose / Directions    ASPIRIN PO        Dose:  81 mg   Take 81 mg by mouth daily   Refills:  0       COREG PO        Dose:  3.125 mg   Take 3.125 mg by mouth 2 times daily (with meals)   Refills:  0       GLIPIZIDE PO        Dose:  5 mg   Take 5 mg by mouth 2 times daily (before meals)   Refills:  0       GLUCOPHAGE PO        Dose:  500 mg   Take 500 mg by mouth 2 times daily (with meals)   Refills:  0       HYDROcodone-acetaminophen 5-325 MG per tablet   Commonly known as:  NORCO   Used for:  Cellulitis of right lower extremity        Dose:  2 tablet   Take 2 tablets by mouth every 6 hours as needed for moderate to severe pain   Quantity:  40 tablet   Refills:  0       LISINOPRIL PO        Dose:  10 mg   Take 10 mg by mouth daily   Refills:  0       LOPRESSOR PO        Dose:  50 mg   Take 50 mg by mouth 2 times daily   Refills:  0       MEVACOR PO        Dose:  20 mg   Take 20 mg by mouth At Bedtime   Refills:  0         STOP taking     PREDNISONE PO                Where to get your medicines      Some of these will need a paper prescription and others can be bought over the counter. Ask your nurse if you have questions.     Bring a paper prescription for each of these medications     HYDROcodone-acetaminophen 5-325 MG per tablet                Protect others around you: Learn how to safely use, store and throw away your medicines at www.disposemymeds.org.             Medication List: This is a list of all your medications and when to take them. Check marks below indicate your daily home schedule. Keep this list as a reference.      Medications           Morning Afternoon Evening Bedtime As Needed    ASPIRIN PO   Take 81 mg by mouth daily   Last time this was given:  81 mg on 5/22/2017  9:15 AM                                   cefTRIAXone 2 GM vial   Commonly known as:  ROCEPHIN   Inject 2 g  into the vein every 24 hours   Last time this was given:  2 g on 5/22/2017 12:42 PM                                   COREG PO   Take 3.125 mg by mouth 2 times daily (with meals)   Last time this was given:  3.125 mg on 5/22/2017  9:15 AM                                      furosemide 20 MG tablet   Commonly known as:  LASIX   Take 1 tablet (20 mg) by mouth 2 times daily                                      GLIPIZIDE PO   Take 5 mg by mouth 2 times daily (before meals)                                      GLUCOPHAGE PO   Take 500 mg by mouth 2 times daily (with meals)                                      HYDROcodone-acetaminophen 5-325 MG per tablet   Commonly known as:  NORCO   Take 2 tablets by mouth every 6 hours as needed for moderate to severe pain   Last time this was given:  2 tablets on 5/22/2017  3:49 PM                                   LISINOPRIL PO   Take 10 mg by mouth daily   Last time this was given:  10 mg on 5/22/2017  9:15 AM                                   LOPRESSOR PO   Take 50 mg by mouth 2 times daily                                      MEVACOR PO   Take 20 mg by mouth At Bedtime                                   warfarin 5 MG tablet   Commonly known as:  COUMADIN   No dose on 5/22/17, then take by mouth 2.5 mg (1/2 tablet) on Wednesdays and 5 mg (1 tablet) all other days or as directed by anticoagulation clinic RN   Last time this was given:  0.5 mg on 5/20/2017  5:25 PM

## 2017-05-15 NOTE — PROGRESS NOTES
S-(situation): Patient arrives to room 217 via cart from ED with 1 ED staff member and RT accompanying him at about 1620    B-(background): respiratory distress possibly related to sepsis with right leg involvement    A-(assessment): arrived on oxiplus mask at 9 LPM oxygen, saturations 93-96 %.  IV vanco infusing along with IV fluids.  Palomino catheter in place.  Patient able to move from ED cart to bed with assistance, and scooting from cart to bed.  Alert and oriented x 4.  Decreased oxygen down to 5 LPM oxiplus mask per RT.  Right leg shin area red and warm, scabbed areas, but no drainage.  Wife present.    R-(recommendations): Orders reviewed with patient and wife . Will monitor patient per MD orders.     Inpatient nursing criteria listed below were met:    Health care directives status obtained and documented: No  Core Measures assessed (SSI): NA  SCD's Documented: NA  Vaccine assessment done and vaccines ordered if appropriate: Yes  Skin issues/needs documented: yes  Isolation needs addressed, if appropriate: NA  Fall Prevention: Care plan updated, Education given and documented Yes  MRSA swab completed for patient 55 years and older (exclude NEHA and TKA): ordered  My Chart patient sign up addressed and documented: declined  Care Plan initiated:   Education Assessment documented:  Education Documented (Pre-existing chronic infection such as, MRSA/VRE need education on admission):   New medication patient education completed and documented (Possible Side Effects of Common Medications handout):   Home medications if not able to send immediately home with family stored here:    Reminder note placed in discharge instructions:   Discharge planning review completed (admission navigator)

## 2017-05-15 NOTE — ED NOTES
Pt presents with altered mental status, confusion. Pt was driving erratic in [place]. Police pulled over and EMS brought pt to ED. Pt confused. Fever. Note lower right leg redness. Large area.Tachpnea.

## 2017-05-15 NOTE — ED NOTES
RR 23. Fentanyl given for restlessness. Pt is calm and sleeping. Record from [facility] received. Med sheet in progress. Note bilateral edema to ankles and feet. +4

## 2017-05-16 PROBLEM — D69.6 THROMBOCYTOPENIA (H): Status: ACTIVE | Noted: 2017-05-16

## 2017-05-16 LAB
ALBUMIN SERPL-MCNC: 2.7 G/DL (ref 3.4–5)
ALP SERPL-CCNC: 68 U/L (ref 40–150)
ALT SERPL W P-5'-P-CCNC: 34 U/L (ref 0–70)
ANION GAP SERPL CALCULATED.3IONS-SCNC: 11 MMOL/L (ref 3–14)
AST SERPL W P-5'-P-CCNC: 20 U/L (ref 0–45)
BACTERIA SPEC CULT: NORMAL
BILIRUB SERPL-MCNC: 0.9 MG/DL (ref 0.2–1.3)
BUN SERPL-MCNC: 23 MG/DL (ref 7–30)
CALCIUM SERPL-MCNC: 7.5 MG/DL (ref 8.5–10.1)
CHLORIDE SERPL-SCNC: 108 MMOL/L (ref 94–109)
CO2 SERPL-SCNC: 24 MMOL/L (ref 20–32)
CREAT SERPL-MCNC: 1.05 MG/DL (ref 0.66–1.25)
ERYTHROCYTE [DISTWIDTH] IN BLOOD BY AUTOMATED COUNT: 15.4 % (ref 10–15)
GFR SERPL CREATININE-BSD FRML MDRD: 71 ML/MIN/1.7M2
GLUCOSE BLDC GLUCOMTR-MCNC: 155 MG/DL (ref 70–99)
GLUCOSE BLDC GLUCOMTR-MCNC: 166 MG/DL (ref 70–99)
GLUCOSE BLDC GLUCOMTR-MCNC: 179 MG/DL (ref 70–99)
GLUCOSE BLDC GLUCOMTR-MCNC: 188 MG/DL (ref 70–99)
GLUCOSE SERPL-MCNC: 214 MG/DL (ref 70–99)
HBA1C MFR BLD: 7.4 % (ref 4.3–6)
HCT VFR BLD AUTO: 41.5 % (ref 40–53)
HGB BLD-MCNC: 13 G/DL (ref 13.3–17.7)
INR PPP: 3.44 (ref 0.86–1.14)
LACTATE BLD-SCNC: 1.5 MMOL/L (ref 0.7–2.1)
MCH RBC QN AUTO: 28.9 PG (ref 26.5–33)
MCHC RBC AUTO-ENTMCNC: 31.3 G/DL (ref 31.5–36.5)
MCV RBC AUTO: 92 FL (ref 78–100)
MICRO REPORT STATUS: NORMAL
PLATELET # BLD AUTO: 102 10E9/L (ref 150–450)
POTASSIUM SERPL-SCNC: 4.2 MMOL/L (ref 3.4–5.3)
PROT SERPL-MCNC: 5.6 G/DL (ref 6.8–8.8)
RBC # BLD AUTO: 4.5 10E12/L (ref 4.4–5.9)
SODIUM SERPL-SCNC: 143 MMOL/L (ref 133–144)
SPECIMEN SOURCE: NORMAL
WBC # BLD AUTO: 15.1 10E9/L (ref 4–11)

## 2017-05-16 PROCEDURE — 99232 SBSQ HOSP IP/OBS MODERATE 35: CPT | Performed by: INTERNAL MEDICINE

## 2017-05-16 PROCEDURE — 85027 COMPLETE CBC AUTOMATED: CPT | Performed by: INTERNAL MEDICINE

## 2017-05-16 PROCEDURE — 80053 COMPREHEN METABOLIC PANEL: CPT | Performed by: INTERNAL MEDICINE

## 2017-05-16 PROCEDURE — 83036 HEMOGLOBIN GLYCOSYLATED A1C: CPT | Performed by: INTERNAL MEDICINE

## 2017-05-16 PROCEDURE — 25000131 ZZH RX MED GY IP 250 OP 636 PS 637: Mod: GY | Performed by: INTERNAL MEDICINE

## 2017-05-16 PROCEDURE — A9270 NON-COVERED ITEM OR SERVICE: HCPCS | Mod: GY | Performed by: INTERNAL MEDICINE

## 2017-05-16 PROCEDURE — 83605 ASSAY OF LACTIC ACID: CPT | Performed by: PEDIATRICS

## 2017-05-16 PROCEDURE — 36415 COLL VENOUS BLD VENIPUNCTURE: CPT | Performed by: PEDIATRICS

## 2017-05-16 PROCEDURE — 25000128 H RX IP 250 OP 636: Performed by: PEDIATRICS

## 2017-05-16 PROCEDURE — 25000128 H RX IP 250 OP 636: Performed by: INTERNAL MEDICINE

## 2017-05-16 PROCEDURE — 20000003 ZZH R&B ICU

## 2017-05-16 PROCEDURE — 36415 COLL VENOUS BLD VENIPUNCTURE: CPT | Performed by: INTERNAL MEDICINE

## 2017-05-16 PROCEDURE — 00000146 ZZHCL STATISTIC GLUCOSE BY METER IP

## 2017-05-16 PROCEDURE — 25000132 ZZH RX MED GY IP 250 OP 250 PS 637: Mod: GY | Performed by: INTERNAL MEDICINE

## 2017-05-16 PROCEDURE — 85610 PROTHROMBIN TIME: CPT | Performed by: INTERNAL MEDICINE

## 2017-05-16 RX ORDER — CARVEDILOL 3.12 MG/1
3.12 TABLET ORAL 2 TIMES DAILY WITH MEALS
Status: DISCONTINUED | OUTPATIENT
Start: 2017-05-16 | End: 2017-05-22 | Stop reason: HOSPADM

## 2017-05-16 RX ORDER — LISINOPRIL 10 MG/1
10 TABLET ORAL DAILY
Status: DISCONTINUED | OUTPATIENT
Start: 2017-05-16 | End: 2017-05-22 | Stop reason: HOSPADM

## 2017-05-16 RX ADMIN — TAZOBACTAM SODIUM AND PIPERACILLIN SODIUM 4.5 G: 500; 4 INJECTION, SOLUTION INTRAVENOUS at 01:35

## 2017-05-16 RX ADMIN — INSULIN ASPART 1 UNITS: 100 INJECTION, SOLUTION INTRAVENOUS; SUBCUTANEOUS at 08:02

## 2017-05-16 RX ADMIN — TAZOBACTAM SODIUM AND PIPERACILLIN SODIUM 4.5 G: 500; 4 INJECTION, SOLUTION INTRAVENOUS at 20:30

## 2017-05-16 RX ADMIN — CARVEDILOL 3.12 MG: 3.12 TABLET, FILM COATED ORAL at 18:07

## 2017-05-16 RX ADMIN — TAZOBACTAM SODIUM AND PIPERACILLIN SODIUM 4.5 G: 500; 4 INJECTION, SOLUTION INTRAVENOUS at 14:38

## 2017-05-16 RX ADMIN — INSULIN ASPART 1 UNITS: 100 INJECTION, SOLUTION INTRAVENOUS; SUBCUTANEOUS at 17:19

## 2017-05-16 RX ADMIN — SODIUM CHLORIDE: 9 INJECTION, SOLUTION INTRAVENOUS at 01:35

## 2017-05-16 RX ADMIN — TAZOBACTAM SODIUM AND PIPERACILLIN SODIUM 4.5 G: 500; 4 INJECTION, SOLUTION INTRAVENOUS at 08:03

## 2017-05-16 RX ADMIN — VANCOMYCIN HYDROCHLORIDE 2000 MG: 10 INJECTION, POWDER, LYOPHILIZED, FOR SOLUTION INTRAVENOUS at 22:25

## 2017-05-16 RX ADMIN — VANCOMYCIN HYDROCHLORIDE 2000 MG: 10 INJECTION, POWDER, LYOPHILIZED, FOR SOLUTION INTRAVENOUS at 10:17

## 2017-05-16 RX ADMIN — HYDROCODONE BITARTRATE AND ACETAMINOPHEN 2 TABLET: 5; 325 TABLET ORAL at 18:08

## 2017-05-16 RX ADMIN — SODIUM CHLORIDE: 9 INJECTION, SOLUTION INTRAVENOUS at 10:17

## 2017-05-16 RX ADMIN — ASPIRIN 81 MG 81 MG: 81 TABLET ORAL at 09:13

## 2017-05-16 RX ADMIN — HYDROCODONE BITARTRATE AND ACETAMINOPHEN 2 TABLET: 5; 325 TABLET ORAL at 12:08

## 2017-05-16 RX ADMIN — LISINOPRIL 10 MG: 10 TABLET ORAL at 09:13

## 2017-05-16 RX ADMIN — SODIUM CHLORIDE 500 ML: 9 INJECTION, SOLUTION INTRAVENOUS at 00:04

## 2017-05-16 RX ADMIN — CARVEDILOL 3.12 MG: 3.12 TABLET, FILM COATED ORAL at 08:02

## 2017-05-16 RX ADMIN — INSULIN ASPART 1 UNITS: 100 INJECTION, SOLUTION INTRAVENOUS; SUBCUTANEOUS at 12:03

## 2017-05-16 NOTE — H&P
[facility]    History and Physical  Hospitalist       Date of Admission:  5/15/2017  Date of Service (when I saw the patient): 05/15/17    Assessment & Plan       Active Problems:    Sepsis (H)    Assessment: secondary to cellulitis on his left leg.  Manifested by hypotension, acute respiratory failure, hypotension, elevated lactic acid, acute encephalopathy, fever and leucocytosis.  After IV fluids and IV antibiotics in the ED his mentation has improved and he is not as tachycardic.  His lactic acid remains high.  BP has improved but remains low.     Plan: admit to ICU, aggressive IV fluids, BC already done, continue vanco and zosyn, follow lactic acid, will give one dose of hydrocortisone with recent use of steroids, consider pressors if lactic acid does not improve with fluids      Left leg cellulitis    Assessment: abrupt onset today with obvious involvement of his left lower extremity    Plan: as above      Acute respiratory failure with hypoxia (H)    Assessment: no symptoms suggesting pneumonia and CXR read as vascular congestion.  Sats are now doing well on FM.  He could have an early pneumonia but no symptoms and lung sounds are clear    Plan: oxygen, monitor pulse ox and treat sepsis as outlined above      Acute encephalopathy    Assessment: now clearing as his sepsis is improving.  Encephalopathy due to sepsis    Plan: monitor      Atrial fibrillation with RVR (H)    Assessment: rates now improved with fluids    Plan: monitor on tele, hold meds until BP stable then resume AV perry blocking meds from home.  Continue coumadin      Type 2 diabetes mellitus without complication (H)    Assessment: chronic and overall control is not known    Plan: cover with correction scale novolog for now and then resume po meds if he does well with po intake      Essential hypertension    Assessment: BP running low    Plan: hold meds      Cardiomyopathy, unspecified (H) - secondary to rapid  atrial flutter    Assessment: EF has improved with last echo showing EF of 53%    Plan: hold ACEI, monitor weights, oxygen sats and daily weights.  Hold diuretics for now      Chronic combined systolic and diastolic congestive heart failure (H)    Assessment: currently controlled    Plan: hold meds and monitor respiratory status      Pulmonary hypertension (H)    Assessment: noted     Plan: oxygen      SONJA (obstructive sleep apnea)    Assessment: chronic    Plan: CPAP      Morbid obesity (H)    Assessment: chronic    Plan: no acute intervention      Chronic pain syndrome    Assessment: no complaints of pain currently other than mild leg pain    Plan: continue vicodin    DVT Prophylaxis: Warfarin  Code Status: Full Code    Disposition: Expected discharge in 2-3 days once sepsis and acute respiratory failure resolve.    Xu Hernandez MD    Primary Care Physician   AVI SIDDIQUI    Chief Complaint   Confusion    History is obtained from the patient, electronic health record and emergency department physician    History of Present Illness   Last Phelps is a 66 year old male who presents with acute confusion.  Patient states he has been feeling tired lately but attributes that to working long hours.  This morning when he got dressed his left leg was normal in appearance.  At work his leg then started to hurt and he noticed it was getting red.  He was going to go home to rest but then was pulled over by police for erratic driving and noted to be very confused. He was brought in by EMS and now admitted.  His leg pain is now only mild but was more painful earlier in the day when he was standing on it. He denies CP, SOB, headache, abdominal pain, diarrhea, dysuria.    Past Medical History    I have reviewed this patient's medical history and updated it with pertinent information if needed.   Past Medical History:   Diagnosis Date     Atrial flutter (H) - s/p ablation      Cardiomyopathy (H) - due to rapid atrial  flutter      Chronic combined systolic and diastolic congestive heart failure (H)      Chronic pain      Essential hypertension      Obesity      SONJA (obstructive sleep apnea)      Persistent atrial fibrillation (H)      Pulmonary hypertension (H)      Type 2 diabetes mellitus (H)        Past Surgical History   I have reviewed this patient's surgical history and updated it with pertinent information if needed.  Past Surgical History:   Procedure Laterality Date     H ABLATION ATRIAL FLUTTER       HERNIA REPAIR Left        Prior to Admission Medications   Prior to Admission Medications   Prescriptions Last Dose Informant Patient Reported? Taking?   ASPIRIN PO 5/15/2017 at 0530  Yes Yes   Sig: Take 81 mg by mouth daily   Carvedilol (COREG PO) 5/15/2017 at 0530  Yes Yes   Sig: Take 3.125 mg by mouth 2 times daily (with meals)   GLIPIZIDE PO 5/15/2017 at 0530  Yes Yes   Sig: Take 5 mg by mouth 2 times daily (before meals)   HYDROcodone-acetaminophen (NORCO) 5-325 MG per tablet Unknown at Unknown time  Yes No   Sig: Take 2 tablets by mouth every 6 hours as needed for moderate to severe pain   LISINOPRIL PO 5/15/2017 at 0530  Yes Yes   Sig: Take 10 mg by mouth daily   Lovastatin (MEVACOR PO) 5/14/2017 at 2100  Yes Yes   Sig: Take 20 mg by mouth At Bedtime   MetFORMIN HCl (GLUCOPHAGE PO) 5/15/2017 at 0530  Yes Yes   Sig: Take 500 mg by mouth 2 times daily (with meals)   Metoprolol Tartrate (LOPRESSOR PO) 5/15/2017 at 0530  Yes Yes   Sig: Take 50 mg by mouth 2 times daily   PREDNISONE PO 5/15/2017 at 0530  Yes Yes   Sig: Take 20 mg by mouth 2 times daily   Warfarin Sodium (COUMADIN PO) 5/15/2017 at 0530  Yes Yes   Sig: Take 5 mg by mouth   Warfarin Sodium (COUMADIN PO)   Yes Yes   Sig: Take 2.5 mg by mouth      Facility-Administered Medications: None     Allergies   Allergies   Allergen Reactions     No Known Allergies        Social History   I have reviewed this patient's social history and updated it with pertinent  information if needed. Last Phelps  reports that he has never smoked. He does not have any smokeless tobacco history on file. He reports that he does not drink alcohol.    Family History   I have reviewed this patient's family history and updated it with pertinent information if needed.   Family History   Problem Relation Age of Onset     CANCER Mother      pancreatic     Chronic Obstructive Pulmonary Disease Father        Review of Systems   The 10 point Review of Systems is negative other than noted in the HPI or here.     Physical Exam   Temp: 99  F (37.2  C) Temp src: Oral BP: (!) 88/68 Pulse: 104 Heart Rate: 101 Resp: (!) 35 SpO2: 97 % O2 Device: Oxi Plus Oxygen Delivery: 5 LPM  Vital Signs with Ranges  Temp:  [99  F (37.2  C)-103.3  F (39.6  C)] 99  F (37.2  C)  Pulse:  [] 104  Heart Rate:  [] 101  Resp:  [21-67] 35  BP: ()/() 88/68  SpO2:  [82 %-100 %] 97 %  329 lbs 0 oz    Constitutional:   awake, alert, cooperative, no apparent distress, and appears stated age     Eyes:   Lids and lashes normal, pupils equal, round and reactive to light, extra ocular muscles intact, sclera clear, conjunctiva normal     ENT:   Normocephalic, without obvious abnormality, atraumatic, sinuses nontender on palpation, external ears without lesions, oral pharynx with moist mucous membranes, tonsils without erythema or exudates, gums normal and good dentition.     Neck:   Supple, symmetrical, trachea midline, no adenopathy, thyroid symmetric, not enlarged and no tenderness, skin normal     Hematologic / Lymphatic:   no cervical lymphadenopathy and no supraclavicular lymphadenopathy     Back:   Symmetric, no curvature, spinous processes are non-tender on palpation, paraspinous muscles are non-tender on palpation, no costal vertebral tenderness     Lungs:   Mild increased work of breathing, good air exchange, clear to auscultation bilaterally, no crackles or wheezing     Cardiovascular:   Irregularly,  irregular and tachycardic.  No murmur, rub or gallop     Abdomen:   normal bowel sounds, soft, non-distended, non-tender, no masses palpated, no hepatosplenomegally     Neurologic:   Awake, alert, oriented to name, place and time.  Cranial nerves II-XII are grossly intact.  Motor is 5 out of 5 bilaterally.   Sensory is intact.      Skin:   Deep violaceous color to the medial aspect of his right calf and progressing laterally where it lightens to a maroon color.  The dorsum of his foot is pink.  The leg is swollen but symmetrically with the left leg.  Mild tenderness to palpation of the calf on the right side       Data   Data reviewed today:  I personally reviewed the chest x-ray image(s) showing assymetric infiltrate left greater than right.    Recent Labs  Lab 05/15/17  1301 05/15/17  1250   WBC  --  16.1*   HGB  --  15.4   MCV  --  91   PLT  --  153   INR 4.70*  --    NA  --  140   POTASSIUM  --  5.0   CHLORIDE  --  103   CO2  --  30   BUN  --  22   CR  --  1.09   ANIONGAP  --  7   JOSE  --  8.8   GLC  --  180*   ALBUMIN  --  3.8   PROTTOTAL  --  7.5   BILITOTAL  --  0.8   ALKPHOS  --  111   ALT  --  51   AST  --  40       Recent Results (from the past 24 hour(s))   XR Chest Port 1 View    Narrative    CHEST ONE VIEW PORTABLE  5/15/2017 1:39 PM     HISTORY: Hypoxia, decreased level of consciousness.    COMPARISON: None.    FINDINGS:  Lungs are hypoaerated. Increased interstitial markings  likely represent vascular crowding and/or atelectasis. Vascular  congestion is considered less likely. Tubular structure projected over  the left upper chest likely represents the breathing apparatus. No  pneumothorax or significant pleural fluid collections are identified.  No fracture is seen.      Impression    IMPRESSION:  1. Hypoaeration and pulmonary vascular crowding.  2. No other evidence of acute cardiopulmonary disease is seen.    FAYE BOWERS MD

## 2017-05-16 NOTE — PHARMACY-ANTICOAGULATION SERVICE
Clinical Pharmacy - Warfarin Dosing Consult     Pharmacy has been consulted to manage this patient s warfarin therapy.  Indication: Atrial Fibrillation  Therapy Goal: INR 2-3  Warfarin Prior to Admission: Yes  Warfarin PTA Regimen: 5mg daily per patient  Medication Interactions: acetaminophen, aspirin, hydrocodone/acetaminophen, zosyn, vancomycin    INR   Date Value Ref Range Status   05/16/2017 3.44 (H) 0.86 - 1.14 Final   05/15/2017 4.70 (H) 0.86 - 1.14 Final       Recommend HOLDING warfarin today, due to elevated INR.  Pharmacy will monitor Last GUY [place] daily and order warfarin doses to achieve specified goal.      Please contact pharmacy as soon as possible if the warfarin needs to be held for a procedure or if the warfarin goals change.

## 2017-05-16 NOTE — PROGRESS NOTES
SPIRITUAL HEALTH SERVICES  SPIRITUAL ASSESSMENT Progress Note  [facility]      Last Phelps declined a visit from .   is available for pt/family needs.    Atul Salas M.Div., [facility]  Staff   Office tel: [phone]

## 2017-05-16 NOTE — PROVIDER NOTIFICATION
.DATE: 5/16/2017    TIME OF RECEIPT FROM LAB:  0110  LAB TEST:  Blood culture  LAB VALUE:  Gram + cocci in chains  RESULTS GIVEN WITH READ-BACK TO (PROVIDER):  Dr. Hernandez  TIME LAB VALUE REPORTED TO PROVIDER:   0115  NEW ORDERS: No new orders at this time

## 2017-05-16 NOTE — PLAN OF CARE
Problem: Goal Outcome Summary  Goal: Goal Outcome Summary  Outcome: Improving  Afebrile.  Softer BP's - systolically 's despite 1 1/2L fluid bolus.  Pt asymptomatic with lower blood pressure. Tele A-fib.  Right lower extremity reddened and warm to touch - outlined.  Does not appear to have receded.   C/O 5-6/10 pain in RLE but refused pain medication.  Pt had home cpap brought in and bled 2 L of oxygen - while awake on room air with sats in the low 90's.  Pt does appear dyspneic with talking and activity but denies any SOA.  Positive blood cultures.

## 2017-05-16 NOTE — PLAN OF CARE
Problem: Goal Outcome Summary  Goal: Goal Outcome Summary  Outcome: No Change  /83, HR 70-80's, afebrile, RA with cpap used when resting. Good appetite and UOP nhung with sediment; mireles dc'd and pient using the urinal at the bedside. Norco given for pain with relief.     Problem: Skin and Soft Tissue Infection (Adult)  Goal: Signs and Symptoms of Listed Potential Problems Will be Absent or Manageable (Skin and Soft Tissue Infection)  Signs and symptoms of listed potential problems will be absent or manageable by discharge/transition of care (reference Skin and Soft Tissue Infection (Adult) CPG).   Patient given norco for pain; with pain decreasing. Redness warm and leg edematous continuing without decreasing below the line. ABX; zosyn and vanco given this shift..

## 2017-05-16 NOTE — PHARMACY-VANCOMYCIN DOSING SERVICE
Pharmacy Vancomycin Note  Date of Service May 16, 2017  Patient's  1950   66 year old, male    Indication: Sepsis  Goal Trough Level: 15-20 mg/L  Day of Therapy: 2  Current Vancomycin regimen:  2000 mg IV q12h    Current estimated CrCl = Estimated Creatinine Clearance: 106.4 mL/min (based on Cr of 1.05).    Creatinine for last 3 days  5/15/2017: 12:50 PM Creatinine 1.09 mg/dL  2017:  5:28 AM Creatinine 1.05 mg/dL    Recent Vancomycin Levels (past 3 days)  No results found for requested labs within last 72 hours.    Vancomycin IV Administrations (past 72 hours)                   vancomycin (VANCOCIN) 2,000 mg in NaCl 0.9 % 500 mL intermittent infusion (mg) 2,000 mg New Bag 17 1017    vancomycin (VANCOCIN) 2,500 mg in NaCl 0.9 % 500 mL intermittent infusion (mg) 2,500 mg New Bag 05/15/17 1419                Nephrotoxins and other renal medications (Future)    Start     Dose/Rate Route Frequency Ordered Stop    17 1000  vancomycin (VANCOCIN) 2,000 mg in NaCl 0.9 % 500 mL intermittent infusion      2,000 mg  200 mL/hr over 2 Hours Intravenous EVERY 12 HOURS 17 0934      17 0900  lisinopril (PRINIVIL/ZESTRIL) tablet 10 mg      10 mg Oral DAILY 17 0554      05/15/17 2000  piperacillin-tazobactam (ZOSYN) intermittent infusion 4.5 g      4.5 g  200 mL/hr over 30 Minutes Intravenous EVERY 6 HOURS 05/15/17 1920               Contrast Orders - past 72 hours     None          Interpretation of levels and current regimen:    Has serum creatinine changed > 50% in last 72 hours: No    Urine output:  good urine output    Renal Function: Stable    Plan:  1.  Continue Current Dose  2.  Pharmacy will check trough levels as appropriate in 1-3 Days.    3. Serum creatinine levels will be ordered daily for the first week of therapy and at least twice weekly for subsequent weeks.      Danitza Mann        .

## 2017-05-16 NOTE — PROGRESS NOTES
[facility]    Hospitalist Progress Note    Date of Service (when I saw the patient): 05/16/2017    Assessment & Plan   Last Phelps is a 66 year old male who was admitted on 5/15/2017.     Active Problems:    Sepsis (H)    Assessment: significantly improved.  BP is now improved and heart rate has come down nicely.  Mentation is clear.  WBC is improving.  GPC growing in 2/2 BC but will leave on both vanco and zosyn for today.  Would stop zosyn tomorrow if he continues to do well.  Temps also coming down.    Plan: continue current antibiotics, slow IV fluids, follow BC ID and sensitivities      Left leg cellulitis    Assessment: no change on exam    Plan: follow      Acute respiratory failure with hypoxia (H)    Assessment: now oxygen sats are normal on RA and lungs are CTA    Plan: continue to monitor      Acute encephalopathy    Assessment: secondary to sepsis but has cleared    Plan: monitor      Atrial fibrillation with RVR (H)    Assessment: heart rate improved as his sepsis has improved    Plan: resume coreg      Type 2 diabetes mellitus without complication (H)    Assessment: BS well controlled thus far    Plan: no change in insulin coverage for now      Essential hypertension    Assessment: BP now normal    Plan: resume coreg and lisinopril      Cardiomyopathy, unspecified (H) - secondary to rapid atrial flutter    Assessment: no signs of acute CHF    Plan: resume coreg and lisinopril and monitor intake and output      Chronic combined systolic and diastolic congestive heart failure (H)    Assessment: no signs of acute CHF    Plan: as above      Pulmonary hypertension (H)    Assessment: chronic    Plan: oxygen prn      Thrombocytopenia (H)    Assessment: suspect he either has marrow suppression or DIC.  No signs of active bleeding    Plan: monitor CBC      SONJA (obstructive sleep apnea)    Assessment: chronic and using his CPAP    Plan: no change      Morbid obesity (H)     Assessment: chronic    Plan: no acute intervention      Chronic pain syndrome    Assessment: no complaints of pain    Plan: vicodin prn    DVT Prophylaxis: Warfarin  Code Status: Full Code    Disposition: Expected discharge in 2 days once sepsis continues to resolve, culture with final ID and sensitivities.    Xu Hernandez MD    Interval History   Slept well.  No SOB, CP.  Mild leg pain.  No nausea or vomiting    -Data reviewed today: I reviewed all new labs and imaging results over the last 24 hours. I personally reviewed no images or EKG's today.    Physical Exam   Temp: 97.8  F (36.6  C) Temp src: Oral BP: 129/89 Pulse: 71 Heart Rate: 83 Resp: (!) 33 SpO2: 90 % O2 Device: None (Room air) Oxygen Delivery: 2 LPM  Vitals:    05/15/17 1233 05/15/17 1640 05/16/17 0413   Weight: 131.5 kg (290 lb) (!) 149.2 kg (329 lb) (!) 152 kg (335 lb 1.6 oz)     Vital Signs with Ranges  Temp:  [97.8  F (36.6  C)-103.3  F (39.6  C)] 97.8  F (36.6  C)  Pulse:  [] 71  Heart Rate:  [] 83  Resp:  [21-67] 33  BP: ()/() 129/89  SpO2:  [82 %-100 %] 90 %  I/O last 3 completed shifts:  In: 1644 [I.V.:1644]  Out: 1400 [Urine:1400]    Lungs:  CTA auscultation throughout        Cardiovascular:   RRR with no murmur, rub or gallop     Abdomen:   +BS.  Soft, NT     Appearance of the erythema is unchanged in location and color    Medications     - MEDICATION INSTRUCTIONS -       NaCl 150 mL/hr at 05/16/17 0135     Warfarin Therapy Reminder         aspirin chewable tablet 81 mg  81 mg Oral Daily     insulin aspart  1-7 Units Subcutaneous TID AC     insulin aspart  1-5 Units Subcutaneous At Bedtime     piperacillin-tazobactam  4.5 g Intravenous Q6H       Data     Recent Labs  Lab 05/16/17  0528 05/15/17  1301 05/15/17  1250   WBC 15.1*  --  16.1*   HGB 13.0*  --  15.4   MCV 92  --  91   *  --  153   INR 3.44* 4.70*  --      --  140   POTASSIUM 4.2  --  5.0   CHLORIDE 108  --  103   CO2 24  --  30   BUN 23   --  22   CR 1.05  --  1.09   ANIONGAP 11  --  7   JOSE 7.5*  --  8.8   *  --  180*   ALBUMIN 2.7*  --  3.8   PROTTOTAL 5.6*  --  7.5   BILITOTAL 0.9  --  0.8   ALKPHOS 68  --  111   ALT 34  --  51   AST 20  --  40       Recent Results (from the past 24 hour(s))   XR Chest Port 1 View    Narrative    CHEST ONE VIEW PORTABLE  5/15/2017 1:39 PM     HISTORY: Hypoxia, decreased level of consciousness.    COMPARISON: None.    FINDINGS:  Lungs are hypoaerated. Increased interstitial markings  likely represent vascular crowding and/or atelectasis. Vascular  congestion is considered less likely. Tubular structure projected over  the left upper chest likely represents the breathing apparatus. No  pneumothorax or significant pleural fluid collections are identified.  No fracture is seen.      Impression    IMPRESSION:  1. Hypoaeration and pulmonary vascular crowding.  2. No other evidence of acute cardiopulmonary disease is seen.    FAYE BOWERS MD

## 2017-05-16 NOTE — PHARMACY-ANTICOAGULATION SERVICE
May 15, 2017, 8:16 PM Patient s goal INR is: 2-3 for the indication of Atrial fibrillation.     Last Phelps's INR level of 4.7 for today is Supratherapeutic for the patient.    Recommend warfarin ZERO mg today.  Pharmacy will continue to monitor Last Phelps's labs daily and order warfarin doses to achieve specified INR goal.      Hector Cassidy RPH  .

## 2017-05-16 NOTE — PROGRESS NOTES
[facility] called to report positive blood culture for strep species, sampled from right arm on 5/15.  Verbal readback completed.  MD updated and aware.

## 2017-05-17 LAB
ANION GAP SERPL CALCULATED.3IONS-SCNC: 9 MMOL/L (ref 3–14)
BACTERIA SPEC CULT: ABNORMAL
BACTERIA SPEC CULT: NO GROWTH
BUN SERPL-MCNC: 21 MG/DL (ref 7–30)
CALCIUM SERPL-MCNC: 7.9 MG/DL (ref 8.5–10.1)
CHLORIDE SERPL-SCNC: 109 MMOL/L (ref 94–109)
CO2 SERPL-SCNC: 24 MMOL/L (ref 20–32)
CREAT SERPL-MCNC: 0.93 MG/DL (ref 0.66–1.25)
ERYTHROCYTE [DISTWIDTH] IN BLOOD BY AUTOMATED COUNT: 15.2 % (ref 10–15)
GFR SERPL CREATININE-BSD FRML MDRD: 81 ML/MIN/1.7M2
GLUCOSE BLDC GLUCOMTR-MCNC: 169 MG/DL (ref 70–99)
GLUCOSE BLDC GLUCOMTR-MCNC: 172 MG/DL (ref 70–99)
GLUCOSE BLDC GLUCOMTR-MCNC: 197 MG/DL (ref 70–99)
GLUCOSE BLDC GLUCOMTR-MCNC: 218 MG/DL (ref 70–99)
GLUCOSE SERPL-MCNC: 188 MG/DL (ref 70–99)
HCT VFR BLD AUTO: 40.7 % (ref 40–53)
HGB BLD-MCNC: 12.7 G/DL (ref 13.3–17.7)
INR PPP: 2.3 (ref 0.86–1.14)
Lab: 30
MCH RBC QN AUTO: 29.1 PG (ref 26.5–33)
MCHC RBC AUTO-ENTMCNC: 31.2 G/DL (ref 31.5–36.5)
MCV RBC AUTO: 93 FL (ref 78–100)
MICRO REPORT STATUS: ABNORMAL
MICRO REPORT STATUS: NORMAL
PLATELET # BLD AUTO: 98 10E9/L (ref 150–450)
POTASSIUM SERPL-SCNC: 4.1 MMOL/L (ref 3.4–5.3)
RBC # BLD AUTO: 4.37 10E12/L (ref 4.4–5.9)
SODIUM SERPL-SCNC: 142 MMOL/L (ref 133–144)
SPECIMEN SOURCE: ABNORMAL
SPECIMEN SOURCE: NORMAL
VANCOMYCIN SERPL-MCNC: 14.1 MG/L
WBC # BLD AUTO: 11.7 10E9/L (ref 4–11)

## 2017-05-17 PROCEDURE — 25000132 ZZH RX MED GY IP 250 OP 250 PS 637: Mod: GY | Performed by: PEDIATRICS

## 2017-05-17 PROCEDURE — 25000128 H RX IP 250 OP 636: Performed by: PEDIATRICS

## 2017-05-17 PROCEDURE — 36415 COLL VENOUS BLD VENIPUNCTURE: CPT | Performed by: PEDIATRICS

## 2017-05-17 PROCEDURE — 36415 COLL VENOUS BLD VENIPUNCTURE: CPT | Performed by: INTERNAL MEDICINE

## 2017-05-17 PROCEDURE — 85027 COMPLETE CBC AUTOMATED: CPT | Performed by: INTERNAL MEDICINE

## 2017-05-17 PROCEDURE — 25000132 ZZH RX MED GY IP 250 OP 250 PS 637: Mod: GY | Performed by: INTERNAL MEDICINE

## 2017-05-17 PROCEDURE — 85610 PROTHROMBIN TIME: CPT | Performed by: INTERNAL MEDICINE

## 2017-05-17 PROCEDURE — A9270 NON-COVERED ITEM OR SERVICE: HCPCS | Mod: GY | Performed by: PEDIATRICS

## 2017-05-17 PROCEDURE — 12000000 ZZH R&B MED SURG/OB

## 2017-05-17 PROCEDURE — 99207 ZZC CDG-UP CODE -MED NECESSITY: CPT | Performed by: PHYSICIAN ASSISTANT

## 2017-05-17 PROCEDURE — 80202 ASSAY OF VANCOMYCIN: CPT | Performed by: PEDIATRICS

## 2017-05-17 PROCEDURE — 25000128 H RX IP 250 OP 636: Performed by: INTERNAL MEDICINE

## 2017-05-17 PROCEDURE — 00000146 ZZHCL STATISTIC GLUCOSE BY METER IP

## 2017-05-17 PROCEDURE — 80048 BASIC METABOLIC PNL TOTAL CA: CPT | Performed by: INTERNAL MEDICINE

## 2017-05-17 PROCEDURE — A9270 NON-COVERED ITEM OR SERVICE: HCPCS | Mod: GY | Performed by: INTERNAL MEDICINE

## 2017-05-17 PROCEDURE — 99232 SBSQ HOSP IP/OBS MODERATE 35: CPT | Performed by: PHYSICIAN ASSISTANT

## 2017-05-17 RX ORDER — WARFARIN SODIUM 5 MG/1
5 TABLET ORAL
Status: COMPLETED | OUTPATIENT
Start: 2017-05-17 | End: 2017-05-17

## 2017-05-17 RX ADMIN — INSULIN ASPART 2 UNITS: 100 INJECTION, SOLUTION INTRAVENOUS; SUBCUTANEOUS at 12:02

## 2017-05-17 RX ADMIN — HYDROCODONE BITARTRATE AND ACETAMINOPHEN 2 TABLET: 5; 325 TABLET ORAL at 17:40

## 2017-05-17 RX ADMIN — VANCOMYCIN HYDROCHLORIDE 2000 MG: 10 INJECTION, POWDER, LYOPHILIZED, FOR SOLUTION INTRAVENOUS at 22:48

## 2017-05-17 RX ADMIN — INSULIN ASPART 1 UNITS: 100 INJECTION, SOLUTION INTRAVENOUS; SUBCUTANEOUS at 17:34

## 2017-05-17 RX ADMIN — SODIUM CHLORIDE: 9 INJECTION, SOLUTION INTRAVENOUS at 17:50

## 2017-05-17 RX ADMIN — HYDROCODONE BITARTRATE AND ACETAMINOPHEN 2 TABLET: 5; 325 TABLET ORAL at 09:51

## 2017-05-17 RX ADMIN — TAZOBACTAM SODIUM AND PIPERACILLIN SODIUM 4.5 G: 500; 4 INJECTION, SOLUTION INTRAVENOUS at 13:39

## 2017-05-17 RX ADMIN — TAZOBACTAM SODIUM AND PIPERACILLIN SODIUM 4.5 G: 500; 4 INJECTION, SOLUTION INTRAVENOUS at 20:28

## 2017-05-17 RX ADMIN — CARVEDILOL 3.12 MG: 3.12 TABLET, FILM COATED ORAL at 17:33

## 2017-05-17 RX ADMIN — INSULIN ASPART 1 UNITS: 100 INJECTION, SOLUTION INTRAVENOUS; SUBCUTANEOUS at 08:58

## 2017-05-17 RX ADMIN — TAZOBACTAM SODIUM AND PIPERACILLIN SODIUM 4.5 G: 500; 4 INJECTION, SOLUTION INTRAVENOUS at 02:12

## 2017-05-17 RX ADMIN — ASPIRIN 81 MG 81 MG: 81 TABLET ORAL at 08:57

## 2017-05-17 RX ADMIN — LISINOPRIL 10 MG: 10 TABLET ORAL at 08:58

## 2017-05-17 RX ADMIN — CARVEDILOL 3.12 MG: 3.12 TABLET, FILM COATED ORAL at 08:58

## 2017-05-17 RX ADMIN — VANCOMYCIN HYDROCHLORIDE 2000 MG: 10 INJECTION, POWDER, LYOPHILIZED, FOR SOLUTION INTRAVENOUS at 10:37

## 2017-05-17 RX ADMIN — WARFARIN SODIUM 5 MG: 5 TABLET ORAL at 17:33

## 2017-05-17 RX ADMIN — HYDROCODONE BITARTRATE AND ACETAMINOPHEN 2 TABLET: 5; 325 TABLET ORAL at 03:44

## 2017-05-17 RX ADMIN — TAZOBACTAM SODIUM AND PIPERACILLIN SODIUM 4.5 G: 500; 4 INJECTION, SOLUTION INTRAVENOUS at 08:55

## 2017-05-17 ASSESSMENT — PAIN DESCRIPTION - DESCRIPTORS
DESCRIPTORS: ACHING
DESCRIPTORS: ACHING

## 2017-05-17 NOTE — PLAN OF CARE
Problem: Skin and Soft Tissue Infection (Adult)  Goal: Signs and Symptoms of Listed Potential Problems Will be Absent or Manageable (Skin and Soft Tissue Infection)  Signs and symptoms of listed potential problems will be absent or manageable by discharge/transition of care (reference Skin and Soft Tissue Infection (Adult) CPG).   Outcome: Improving  Note that pt has been afebrile all day.  Up in recliner this morning and has been there all day.  Vicoden given for pain X1 today with good relief.  Note that there is redness present in RLE, edema present, pulses +.  Pt is eating good.  Lower extremities have been elevated all day.  Note that pt is slightly unsteady when he is up.

## 2017-05-17 NOTE — PROGRESS NOTES
[facility]    Hospitalist Progress Note    Date of Service (when I saw the patient): 05/17/2017     Assessment & Plan   Last Phelps is a 66 year old male who was admitted on 5/15/2017.     Active Problems:    Sepsis (H)    Assessment: WBC count continues to trend down. Vitals have been stable, afebrile Mentation remains clear. BC shows strep species, awaiting sensitivity.     Plan:Continue Vancomycin and zoysyn    Left leg cellulitis    Assessment: Remain erythematous, warm to the touch, painful.     Plan: continue vancomycin, zoysynrepeat CBC in A.M.     Acute respiratory failure with hypoxia (H)    Assessment: maintaining adequate saturation on room air.     Plan:Monitor    Acute encephalopathy    Assessment: related to sepsis, has now cleared    Plan: Continue to monitor    Atrial fibrillation with RVR (H)    Assessment: rate is controlled    Plan: Continue coreg    Type 2 diabetes mellitus without complication (H)    Assessment: blood sugars well controlled    Plan: Continue current coverage    Essential hypertension    Assessment: controlled    Plan: Continue lisinopril, coreg    Cardiomyopathy, unspecified (H) - secondary to rapid atrial flutter    Assessment: no symptoms of fluid overload.     Plan: continue coreg, lisinopril. Monitor I & O    Chronic combined systolic and diastolic congestive heart failure (H)    Assessment: no symptoms suggestive of CHF    Plan: continue coreg, lisinopril, monitor I & O's    SONJA (obstructive sleep apnea)    Assessment: on cpap    Plan: continue home CPAP    Morbid obesity (H)    Assessment: chronic    Plan: no intervention    Pulmonary hypertension (H)    Assessment: chronic    Plan: oxygen as needed    Chronic pain syndrome    Assessment: Complains of leg pain with ambulation    Plan: Vicodin available prn    Thrombocytopenia (H)    Assessment: Stable from yesterday. no signs of blleeding    Plan: repeat CBC in A.M.    Goyo CRANE  Joaquin    Interval History   Feels well with exception of the leg. Expresses frustration that it is not improving. WBC continues to trend down. Using cpap with naps, at night. Tolerating medications. No new concerns today.     Physical Exam   Temp: 98.2  F (36.8  C) Temp src: Oral BP: (!) 118/93 Pulse: 100 Heart Rate: 94 Resp: 20 SpO2: 93 % O2 Device: None (Room air)    Vitals:    05/15/17 1640 05/16/17 0413 05/17/17 0615   Weight: (!) 149.2 kg (329 lb) (!) 152 kg (335 lb 1.6 oz) (!) 150 kg (330 lb 11 oz)     Vital Signs with Ranges  Temp:  [98  F (36.7  C)-98.6  F (37  C)] 98.2  F (36.8  C)  Pulse:  [] 100  Heart Rate:  [84-96] 94  Resp:  [20-26] 20  BP: ()/(73-96) 118/93  SpO2:  [90 %-94 %] 93 %  I/O last 3 completed shifts:  In: 3783.33 [P.O.:1680; I.V.:2103.33]  Out: 1750 [Urine:1750]    Constitutional: awake, alert, cooperative, no apparent distress, and appears stated age  Respiratory: No increased work of breathing, good air exchange, clear to auscultation bilaterally, no crackles or wheezing  Cardiovascular: irregularly irregular rhythm  GI: normal bowel sounds, soft, distended and non-tender  Skin: right lower extremity remains erythematous, swollen, warm to the touch. Erythema has moved more within previous outline.   Musculoskeletal: right leg swollen, red, warm.     Medications     - MEDICATION INSTRUCTIONS -       NaCl 100 mL/hr at 05/16/17 1017     Warfarin Therapy Reminder         warfarin  5 mg Oral ONCE at 18:00     carvedilol (COREG) tablet 3.125 mg  3.125 mg Oral BID w/meals     lisinopril (PRINIVIL/ZESTRIL) tablet 10 mg  10 mg Oral Daily     vancomycin (VANCOCIN) IV  2,000 mg Intravenous Q12H     aspirin chewable tablet 81 mg  81 mg Oral Daily     insulin aspart  1-7 Units Subcutaneous TID AC     insulin aspart  1-5 Units Subcutaneous At Bedtime     piperacillin-tazobactam  4.5 g Intravenous Q6H       Data   Results for orders placed or performed during the hospital encounter of  05/15/17 (from the past 24 hour(s))   Glucose by meter   Result Value Ref Range    Glucose 166 (H) 70 - 99 mg/dL   Glucose by meter   Result Value Ref Range    Glucose 188 (H) 70 - 99 mg/dL   INR   Result Value Ref Range    INR 2.30 (H) 0.86 - 1.14   CBC with platelets   Result Value Ref Range    WBC 11.7 (H) 4.0 - 11.0 10e9/L    RBC Count 4.37 (L) 4.4 - 5.9 10e12/L    Hemoglobin 12.7 (L) 13.3 - 17.7 g/dL    Hematocrit 40.7 40.0 - 53.0 %    MCV 93 78 - 100 fl    MCH 29.1 26.5 - 33.0 pg    MCHC 31.2 (L) 31.5 - 36.5 g/dL    RDW 15.2 (H) 10.0 - 15.0 %    Platelet Count 98 (L) 150 - 450 10e9/L   Basic metabolic panel   Result Value Ref Range    Sodium 142 133 - 144 mmol/L    Potassium 4.1 3.4 - 5.3 mmol/L    Chloride 109 94 - 109 mmol/L    Carbon Dioxide 24 20 - 32 mmol/L    Anion Gap 9 3 - 14 mmol/L    Glucose 188 (H) 70 - 99 mg/dL    Urea Nitrogen 21 7 - 30 mg/dL    Creatinine 0.93 0.66 - 1.25 mg/dL    GFR Estimate 81 >60 mL/min/1.7m2    GFR Estimate If Black >90   GFR Calc   >60 mL/min/1.7m2    Calcium 7.9 (L) 8.5 - 10.1 mg/dL   Glucose by meter   Result Value Ref Range    Glucose 169 (H) 70 - 99 mg/dL   Glucose by meter   Result Value Ref Range    Glucose 218 (H) 70 - 99 mg/dL

## 2017-05-17 NOTE — PLAN OF CARE
"Problem: Goal Outcome Summary  Goal: Goal Outcome Summary  Outcome: Improving  Has slept at intervals, using home CPAP. States leg pain is \"getting old\". States it makes him clumsy. Unsteady when up. Right leg is reddened and swollen. Patient states it doesn't seem much better. Afebrile. Medicated with Norco for pain.      "

## 2017-05-17 NOTE — PLAN OF CARE
Problem: Goal Outcome Summary  Goal: Goal Outcome Summary  Outcome: Improving  Pt alert and oriented. VSS on room air.  Uses CPAP while sleeping.  RLE remains reddened, edematous, painful.  Leg elevated while in bed, prn norco given for pain.  Pt was up to chair with assist of 1 staff, becomes very SOB on exertion.  [place] catheter was removed this afternoon, patient now voiding per urinal, clear nhung urine.

## 2017-05-18 ENCOUNTER — APPOINTMENT (OUTPATIENT)
Dept: ULTRASOUND IMAGING | Facility: CLINIC | Age: 67
DRG: 871 | End: 2017-05-18
Attending: PHYSICIAN ASSISTANT
Payer: MEDICARE

## 2017-05-18 LAB
ANION GAP SERPL CALCULATED.3IONS-SCNC: 7 MMOL/L (ref 3–14)
BUN SERPL-MCNC: 17 MG/DL (ref 7–30)
CALCIUM SERPL-MCNC: 8.4 MG/DL (ref 8.5–10.1)
CHLORIDE SERPL-SCNC: 108 MMOL/L (ref 94–109)
CO2 SERPL-SCNC: 28 MMOL/L (ref 20–32)
CREAT SERPL-MCNC: 0.89 MG/DL (ref 0.66–1.25)
ERYTHROCYTE [DISTWIDTH] IN BLOOD BY AUTOMATED COUNT: 15 % (ref 10–15)
GFR SERPL CREATININE-BSD FRML MDRD: 85 ML/MIN/1.7M2
GLUCOSE BLDC GLUCOMTR-MCNC: 163 MG/DL (ref 70–99)
GLUCOSE BLDC GLUCOMTR-MCNC: 192 MG/DL (ref 70–99)
GLUCOSE BLDC GLUCOMTR-MCNC: 193 MG/DL (ref 70–99)
GLUCOSE BLDC GLUCOMTR-MCNC: 202 MG/DL (ref 70–99)
GLUCOSE BLDC GLUCOMTR-MCNC: 211 MG/DL (ref 70–99)
GLUCOSE SERPL-MCNC: 185 MG/DL (ref 70–99)
HCT VFR BLD AUTO: 39.8 % (ref 40–53)
HGB BLD-MCNC: 12.3 G/DL (ref 13.3–17.7)
INR PPP: 1.8 (ref 0.86–1.14)
LACTATE BLD-SCNC: 1 MMOL/L (ref 0.7–2.1)
MCH RBC QN AUTO: 28.9 PG (ref 26.5–33)
MCHC RBC AUTO-ENTMCNC: 30.9 G/DL (ref 31.5–36.5)
MCV RBC AUTO: 94 FL (ref 78–100)
PLATELET # BLD AUTO: 109 10E9/L (ref 150–450)
POTASSIUM SERPL-SCNC: 4.3 MMOL/L (ref 3.4–5.3)
RBC # BLD AUTO: 4.25 10E12/L (ref 4.4–5.9)
SODIUM SERPL-SCNC: 143 MMOL/L (ref 133–144)
WBC # BLD AUTO: 9.7 10E9/L (ref 4–11)

## 2017-05-18 PROCEDURE — 83605 ASSAY OF LACTIC ACID: CPT | Performed by: PEDIATRICS

## 2017-05-18 PROCEDURE — 93971 EXTREMITY STUDY: CPT | Mod: RT

## 2017-05-18 PROCEDURE — 36415 COLL VENOUS BLD VENIPUNCTURE: CPT | Performed by: INTERNAL MEDICINE

## 2017-05-18 PROCEDURE — 25000132 ZZH RX MED GY IP 250 OP 250 PS 637: Mod: GY | Performed by: INTERNAL MEDICINE

## 2017-05-18 PROCEDURE — 25000128 H RX IP 250 OP 636: Performed by: PEDIATRICS

## 2017-05-18 PROCEDURE — 00000146 ZZHCL STATISTIC GLUCOSE BY METER IP

## 2017-05-18 PROCEDURE — 25000132 ZZH RX MED GY IP 250 OP 250 PS 637: Mod: GY | Performed by: PEDIATRICS

## 2017-05-18 PROCEDURE — A9270 NON-COVERED ITEM OR SERVICE: HCPCS | Mod: GY | Performed by: PEDIATRICS

## 2017-05-18 PROCEDURE — 85610 PROTHROMBIN TIME: CPT | Performed by: INTERNAL MEDICINE

## 2017-05-18 PROCEDURE — 12000000 ZZH R&B MED SURG/OB

## 2017-05-18 PROCEDURE — A9270 NON-COVERED ITEM OR SERVICE: HCPCS | Mod: GY | Performed by: INTERNAL MEDICINE

## 2017-05-18 PROCEDURE — 99232 SBSQ HOSP IP/OBS MODERATE 35: CPT | Performed by: PHYSICIAN ASSISTANT

## 2017-05-18 PROCEDURE — 99207 ZZC CDG-UP CODE -MED NECESSITY: CPT | Performed by: PHYSICIAN ASSISTANT

## 2017-05-18 PROCEDURE — 80048 BASIC METABOLIC PNL TOTAL CA: CPT | Performed by: INTERNAL MEDICINE

## 2017-05-18 PROCEDURE — 85027 COMPLETE CBC AUTOMATED: CPT | Performed by: INTERNAL MEDICINE

## 2017-05-18 PROCEDURE — 25000128 H RX IP 250 OP 636: Performed by: INTERNAL MEDICINE

## 2017-05-18 RX ORDER — WARFARIN SODIUM 5 MG/1
5 TABLET ORAL
Status: COMPLETED | OUTPATIENT
Start: 2017-05-18 | End: 2017-05-18

## 2017-05-18 RX ADMIN — HYDROCODONE BITARTRATE AND ACETAMINOPHEN 2 TABLET: 5; 325 TABLET ORAL at 17:41

## 2017-05-18 RX ADMIN — TAZOBACTAM SODIUM AND PIPERACILLIN SODIUM 4.5 G: 500; 4 INJECTION, SOLUTION INTRAVENOUS at 03:06

## 2017-05-18 RX ADMIN — CARVEDILOL 3.12 MG: 3.12 TABLET, FILM COATED ORAL at 17:41

## 2017-05-18 RX ADMIN — TAZOBACTAM SODIUM AND PIPERACILLIN SODIUM 4.5 G: 500; 4 INJECTION, SOLUTION INTRAVENOUS at 08:13

## 2017-05-18 RX ADMIN — TAZOBACTAM SODIUM AND PIPERACILLIN SODIUM 4.5 G: 500; 4 INJECTION, SOLUTION INTRAVENOUS at 14:39

## 2017-05-18 RX ADMIN — VANCOMYCIN HYDROCHLORIDE 2000 MG: 10 INJECTION, POWDER, LYOPHILIZED, FOR SOLUTION INTRAVENOUS at 21:23

## 2017-05-18 RX ADMIN — INSULIN ASPART 2 UNITS: 100 INJECTION, SOLUTION INTRAVENOUS; SUBCUTANEOUS at 17:25

## 2017-05-18 RX ADMIN — INSULIN ASPART 2 UNITS: 100 INJECTION, SOLUTION INTRAVENOUS; SUBCUTANEOUS at 12:58

## 2017-05-18 RX ADMIN — ACETAMINOPHEN 650 MG: 325 TABLET ORAL at 05:44

## 2017-05-18 RX ADMIN — SODIUM CHLORIDE: 9 INJECTION, SOLUTION INTRAVENOUS at 06:27

## 2017-05-18 RX ADMIN — INSULIN ASPART 1 UNITS: 100 INJECTION, SOLUTION INTRAVENOUS; SUBCUTANEOUS at 08:07

## 2017-05-18 RX ADMIN — CARVEDILOL 3.12 MG: 3.12 TABLET, FILM COATED ORAL at 08:07

## 2017-05-18 RX ADMIN — HYDROCODONE BITARTRATE AND ACETAMINOPHEN 2 TABLET: 5; 325 TABLET ORAL at 05:44

## 2017-05-18 RX ADMIN — ASPIRIN 81 MG 81 MG: 81 TABLET ORAL at 08:07

## 2017-05-18 RX ADMIN — WARFARIN SODIUM 5 MG: 5 TABLET ORAL at 17:42

## 2017-05-18 RX ADMIN — TAZOBACTAM SODIUM AND PIPERACILLIN SODIUM 4.5 G: 500; 4 INJECTION, SOLUTION INTRAVENOUS at 19:57

## 2017-05-18 RX ADMIN — VANCOMYCIN HYDROCHLORIDE 2000 MG: 10 INJECTION, POWDER, LYOPHILIZED, FOR SOLUTION INTRAVENOUS at 11:44

## 2017-05-18 RX ADMIN — POLYETHYLENE GLYCOL 3350 17 G: 17 POWDER, FOR SOLUTION ORAL at 16:05

## 2017-05-18 RX ADMIN — LISINOPRIL 10 MG: 10 TABLET ORAL at 08:07

## 2017-05-18 NOTE — PHARMACY-ANTICOAGULATION SERVICE
Clinical Pharmacy - Warfarin Dosing Consult     Pharmacy has been consulted to manage this patient s warfarin therapy.  Indication: Atrial Fibrillation  Therapy Goal: INR 2-3  Warfarin Prior to Admission: Yes  Warfarin PTA Regimen: 5mg daily per patient    INR   Date Value Ref Range Status   05/18/2017 1.80 (H) 0.86 - 1.14 Final   05/17/2017 2.30 (H) 0.86 - 1.14 Final       Recommend warfarin 5 mg today.  Pharmacy will monitor Last Phelps daily and order warfarin doses to achieve specified goal.      Please contact pharmacy as soon as possible if the warfarin needs to be held for a procedure or if the warfarin goals change.

## 2017-05-18 NOTE — PHARMACY-VANCOMYCIN DOSING SERVICE
Pharmacy Vancomycin Note  Date of Service May 18, 2017  Patient's  1950   66 year old, male    Indication: Sepsis  Goal Trough Level: 15-20 mg/L  Day of Therapy: 4  Current Vancomycin regimen:  2000 mg IV q12h    Current estimated CrCl = Estimated Creatinine Clearance: 124.6 mL/min (based on Cr of 0.89).    Creatinine for last 3 days  2017:  5:28 AM Creatinine 1.05 mg/dL  2017:  5:41 AM Creatinine 0.93 mg/dL  2017:  4:00 AM Creatinine 0.89 mg/dL    Recent Vancomycin Levels (past 3 days)  2017:  9:15 PM Vancomycin Level 14.1 mg/L    Vancomycin IV Administrations (past 72 hours)                   vancomycin (VANCOCIN) 2,000 mg in NaCl 0.9 % 500 mL intermittent infusion (mg) 2,000 mg New Bag 17 1144     2,000 mg New Bag 17 2248     2,000 mg New Bag  1037     2,000 mg New Bag 17 2225     2,000 mg New Bag  1017                Nephrotoxins and other renal medications (Future)    Start     Dose/Rate Route Frequency Ordered Stop    17 1000  vancomycin (VANCOCIN) 2,000 mg in NaCl 0.9 % 500 mL intermittent infusion      2,000 mg  200 mL/hr over 2 Hours Intravenous EVERY 12 HOURS 17 0934      17 0900  lisinopril (PRINIVIL/ZESTRIL) tablet 10 mg      10 mg Oral DAILY 17 0554      05/15/17 2000  piperacillin-tazobactam (ZOSYN) intermittent infusion 4.5 g      4.5 g  200 mL/hr over 30 Minutes Intravenous EVERY 6 HOURS 05/15/17 1920               Contrast Orders - past 72 hours     None          Interpretation of levels and current regimen:  Has serum creatinine changed > 50% in last 72 hours: No    Urine output:  unable to determine    Renal Function: Stable    Plan:  1.  Continue Current Dose  2.  Pharmacy will check trough levels as appropriate in 1-3 Days.    3. Serum creatinine levels will be ordered daily for the first week of therapy and at least twice weekly for subsequent weeks.      Harrison Garcia        .

## 2017-05-18 NOTE — PLAN OF CARE
"Problem: Goal Outcome Summary  Goal: Goal Outcome Summary  Outcome: No Change  S-(situation): end of shift note     B-(background): sepsis, cellulitis of right leg     A-(assessment): Upon entering pt's room last evening to introduce myself and do my assessment, pt became very angry about how his care ISN'T progressing in the right direction.  Stating he has been here for 4 days and \"noone knows what is going on with my leg and it hasn't gotten any better and I'm sick of being in pain.  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  This morning he became angry again and I just reiterated that he needs to speak with the doctors when they round today.  Pt in agreement with this plan. Temperature was increased during the night, flagged for lactic acid which came back as 1.0.  Tylenol and norco given for temperature and pain in right leg.  Refused to stand for a morning weight.      R-(recommendations): Continue to monitor right leg and for pain.          "

## 2017-05-18 NOTE — PHARMACY-VANCOMYCIN DOSING SERVICE
Pharmacy Vancomycin Note  Date of Service May 17, 2017  Patient's  1950   66 year old, male    Indication: Sepsis  Goal Trough Level: 15-20 mg/L  Day of Therapy: 3  Current Vancomycin regimen:  2000 mg IV q12h    Current estimated CrCl = Estimated Creatinine Clearance: 119.2 mL/min (based on Cr of 0.93).    Creatinine for last 3 days  5/15/2017: 12:50 PM Creatinine 1.09 mg/dL  2017:  5:28 AM Creatinine 1.05 mg/dL  2017:  5:41 AM Creatinine 0.93 mg/dL    Recent Vancomycin Levels (past 3 days)  2017:  9:15 PM Vancomycin Level 14.1 mg/L    Vancomycin IV Administrations (past 72 hours)                   vancomycin (VANCOCIN) 2,000 mg in NaCl 0.9 % 500 mL intermittent infusion (mg) 2,000 mg New Bag 17 1037     2,000 mg New Bag 17 2225     2,000 mg New Bag  1017                Nephrotoxins and other renal medications (Future)    Start     Dose/Rate Route Frequency Ordered Stop    17 1000  vancomycin (VANCOCIN) 2,000 mg in NaCl 0.9 % 500 mL intermittent infusion      2,000 mg  200 mL/hr over 2 Hours Intravenous EVERY 12 HOURS 17 0934      17 0900  lisinopril (PRINIVIL/ZESTRIL) tablet 10 mg      10 mg Oral DAILY 17 0554      05/15/17 2000  piperacillin-tazobactam (ZOSYN) intermittent infusion 4.5 g      4.5 g  200 mL/hr over 30 Minutes Intravenous EVERY 6 HOURS 05/15/17 1920               Contrast Orders - past 72 hours     None          Interpretation of levels and current regimen:  Trough level is slightly Subtherapeutic, however, not low enough to warrant changing dose or regimen schedule at this time.    Has serum creatinine changed > 50% in last 72 hours: No    Urine output:  good urine output    Renal Function: Stable    Plan:  1.  Continue Current Dose  2.  Pharmacy will check trough levels as appropriate in 1-3 Days.    3. Serum creatinine levels will be ordered daily for the first week of therapy and at least twice weekly for subsequent weeks.      Harrison  Jose        .

## 2017-05-18 NOTE — PROGRESS NOTES
[facility]    Hospitalist Progress Note    Date of Service (when I saw the patient): 05/18/2017     Assessment & Plan   Last Phelps is a 66 year old male who was admitted on 5/15/2017.     Active Problems:    Sepsis (H)    Assessment: secondary to lower extremity cellulitis.  Fever last night otherwise VSS. WBC within normal limits. Mentation appears at baseline.     Plan: Continue Zosyn/vancomycin.     Left leg cellulitis    Assessment: Area continues to be red and warm, erythema is not receding. Elevated temp last night.     Plan: Will get lower extremity ultrasound to evaluate for abscess and DVT.     Acute respiratory failure with hypoxia (H)    Assessment:maintaining saturation > 90% on room air. Wearing CPAP with sleep.     Plan: Monitor, oxygen as needed.     Acute encephalopathy    Assessment: This resolved after initiation of IV fluids, antibiotics    Plan: Monitor    Atrial fibrillation with RVR (H)    Assessment: Rate has been controlled.     Plan: continue coreg    Type 2 diabetes mellitus without complication (H)    Assessment: controled    Plan: Continue current diet, coverage    Essential hypertension    Assessment: controlled    Plan: Continue lisinopril, coreg    Cardiomyopathy, unspecified (H) - secondary to rapid atrial flutter    Assessment: No current symptoms suggestive of heart failure    Plan:  Monitor, Continue lisinopril, coreg    Chronic combined systolic and diastolic congestive heart failure (H)    Assessment: No current symptoms suggestive of heart failure    Plan:  Continue lisinopril, coreg    SONJA (obstructive sleep apnea)    Assessment: on cpap at home settings    Plan: Continue CPAP    Morbid obesity (H)    Assessment: Chronic    Plan: No intervention    Pulmonary hypertension (H)    Assessment: Chronic    Plan: oxygen available PRN, continue cpap with sleep    Chronic pain syndrome    Assessment: ongoing    Plan: vicodin available PRN     Thrombocytopenia (H)    Assessment: remains stable    Plan: monitor    Goyo Nuñez    Interval History   He is able to bear more weight on the right leg without pain. WBC continues to improve. Right lower extremity erythema, warmth remains unchanged. 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 Eating/voiding without difficulty. No new concerns today.   Physical Exam   Temp: 98.3  F (36.8  C) Temp src: Oral BP: 108/61 Pulse: 113 Heart Rate: 113 Resp: 28 SpO2: 92 % O2 Device: None (Room air)    Vitals:    05/15/17 1640 05/16/17 0413 05/17/17 0615   Weight: (!) 149.2 kg (329 lb) (!) 152 kg (335 lb 1.6 oz) (!) 150 kg (330 lb 11 oz)     Vital Signs with Ranges  Temp:  [98  F (36.7  C)-101.1  F (38.4  C)] 98.3  F (36.8  C)  Pulse:  [] 113  Heart Rate:  [] 113  Resp:  [20-30] 28  BP: (108-141)/(36-91) 108/61  SpO2:  [90 %-94 %] 92 %  I/O last 3 completed shifts:  In: 4286 [P.O.:1060; I.V.:3226]  Out: 1475 [Urine:1475]    Constitutional: awake, alert, cooperative, no apparent distress, and appears stated age  Respiratory: No increased work of breathing, good air exchange, clear to auscultation bilaterally, no crackles or wheezing Frequent cough  Cardiovascular: regular rate and rhythm  GI: normal bowel sounds, firm, non-distended and non-tender  Skin: right lower extremity erythema has not receded compared to yesterday. Still warm to the touch  Musculoskeletal: bilateral lower extremity edema present right>Left    Medications     - MEDICATION INSTRUCTIONS -       Warfarin Therapy Reminder         warfarin  5 mg Oral ONCE at 18:00     carvedilol (COREG) tablet 3.125 mg  3.125 mg Oral BID w/meals     lisinopril (PRINIVIL/ZESTRIL) tablet 10 mg  10 mg Oral Daily     vancomycin (VANCOCIN) IV  2,000 mg Intravenous Q12H     aspirin chewable tablet 81 mg  81 mg Oral Daily     insulin aspart  1-7 Units Subcutaneous TID AC     insulin aspart  1-5 Units Subcutaneous At Bedtime     piperacillin-tazobactam   4.5 g Intravenous Q6H       Data   Results for orders placed or performed during the hospital encounter of 05/15/17 (from the past 24 hour(s))   Glucose by meter   Result Value Ref Range    Glucose 172 (H) 70 - 99 mg/dL   Glucose by meter   Result Value Ref Range    Glucose 197 (H) 70 - 99 mg/dL   Vancomycin level   Result Value Ref Range    Vancomycin Level 14.1 mg/L   Glucose by meter   Result Value Ref Range    Glucose 192 (H) 70 - 99 mg/dL   INR   Result Value Ref Range    INR 1.80 (H) 0.86 - 1.14   CBC with platelets   Result Value Ref Range    WBC 9.7 4.0 - 11.0 10e9/L    RBC Count 4.25 (L) 4.4 - 5.9 10e12/L    Hemoglobin 12.3 (L) 13.3 - 17.7 g/dL    Hematocrit 39.8 (L) 40.0 - 53.0 %    MCV 94 78 - 100 fl    MCH 28.9 26.5 - 33.0 pg    MCHC 30.9 (L) 31.5 - 36.5 g/dL    RDW 15.0 10.0 - 15.0 %    Platelet Count 109 (L) 150 - 450 10e9/L   Basic metabolic panel   Result Value Ref Range    Sodium 143 133 - 144 mmol/L    Potassium 4.3 3.4 - 5.3 mmol/L    Chloride 108 94 - 109 mmol/L    Carbon Dioxide 28 20 - 32 mmol/L    Anion Gap 7 3 - 14 mmol/L    Glucose 185 (H) 70 - 99 mg/dL    Urea Nitrogen 17 7 - 30 mg/dL    Creatinine 0.89 0.66 - 1.25 mg/dL    GFR Estimate 85 >60 mL/min/1.7m2    GFR Estimate If Black >90   GFR Calc   >60 mL/min/1.7m2    Calcium 8.4 (L) 8.5 - 10.1 mg/dL   Lactic acid level STAT   Result Value Ref Range    Lactic Acid 1.0 0.7 - 2.1 mmol/L   Glucose by meter   Result Value Ref Range    Glucose 163 (H) 70 - 99 mg/dL   Glucose by meter   Result Value Ref Range    Glucose 211 (H) 70 - 99 mg/dL

## 2017-05-18 NOTE — PLAN OF CARE
Problem: Goal Outcome Summary  Goal: Goal Outcome Summary  Outcome: Therapy, progress toward functional goals as expected  S-(situation): shift note     B-(background): cellulitis right leg     A-(assessment): Pt is A&O.  Very discouraged about not being able to go home.  Lower part of right leg is still very red.  Denied need for pain med this shift.  Voided in urinal per self.  VSS.  Afebrile.     R-(recommendations): Will cont to monitor the above.

## 2017-05-19 PROBLEM — R78.81 GRAM-POSITIVE BACTEREMIA: Status: ACTIVE | Noted: 2017-05-19

## 2017-05-19 LAB
ERYTHROCYTE [DISTWIDTH] IN BLOOD BY AUTOMATED COUNT: 15 % (ref 10–15)
GLUCOSE BLDC GLUCOMTR-MCNC: 182 MG/DL (ref 70–99)
GLUCOSE BLDC GLUCOMTR-MCNC: 187 MG/DL (ref 70–99)
GLUCOSE BLDC GLUCOMTR-MCNC: 189 MG/DL (ref 70–99)
GLUCOSE BLDC GLUCOMTR-MCNC: 238 MG/DL (ref 70–99)
GLUCOSE BLDC GLUCOMTR-MCNC: 282 MG/DL (ref 70–99)
HCT VFR BLD AUTO: 41.5 % (ref 40–53)
HGB BLD-MCNC: 13.1 G/DL (ref 13.3–17.7)
INR PPP: 2.39 (ref 0.86–1.14)
LACTATE BLD-SCNC: 1.3 MMOL/L (ref 0.7–2.1)
MCH RBC QN AUTO: 29 PG (ref 26.5–33)
MCHC RBC AUTO-ENTMCNC: 31.6 G/DL (ref 31.5–36.5)
MCV RBC AUTO: 92 FL (ref 78–100)
PLATELET # BLD AUTO: 127 10E9/L (ref 150–450)
RBC # BLD AUTO: 4.51 10E12/L (ref 4.4–5.9)
VANCOMYCIN SERPL-MCNC: 12.1 MG/L
WBC # BLD AUTO: 10.4 10E9/L (ref 4–11)

## 2017-05-19 PROCEDURE — 80202 ASSAY OF VANCOMYCIN: CPT | Performed by: PEDIATRICS

## 2017-05-19 PROCEDURE — 25000132 ZZH RX MED GY IP 250 OP 250 PS 637: Mod: GY | Performed by: INTERNAL MEDICINE

## 2017-05-19 PROCEDURE — 00000146 ZZHCL STATISTIC GLUCOSE BY METER IP

## 2017-05-19 PROCEDURE — 99232 SBSQ HOSP IP/OBS MODERATE 35: CPT | Performed by: FAMILY MEDICINE

## 2017-05-19 PROCEDURE — 85027 COMPLETE CBC AUTOMATED: CPT | Performed by: FAMILY MEDICINE

## 2017-05-19 PROCEDURE — 25000132 ZZH RX MED GY IP 250 OP 250 PS 637: Mod: GY | Performed by: PEDIATRICS

## 2017-05-19 PROCEDURE — 85610 PROTHROMBIN TIME: CPT | Performed by: INTERNAL MEDICINE

## 2017-05-19 PROCEDURE — 25000128 H RX IP 250 OP 636: Performed by: PEDIATRICS

## 2017-05-19 PROCEDURE — A9270 NON-COVERED ITEM OR SERVICE: HCPCS | Mod: GY | Performed by: INTERNAL MEDICINE

## 2017-05-19 PROCEDURE — A9270 NON-COVERED ITEM OR SERVICE: HCPCS | Mod: GY | Performed by: PEDIATRICS

## 2017-05-19 PROCEDURE — 99207 ZZC MOONLIGHTING INDICATOR: CPT | Performed by: FAMILY MEDICINE

## 2017-05-19 PROCEDURE — 36415 COLL VENOUS BLD VENIPUNCTURE: CPT | Performed by: PEDIATRICS

## 2017-05-19 PROCEDURE — 25000128 H RX IP 250 OP 636: Performed by: FAMILY MEDICINE

## 2017-05-19 PROCEDURE — 12000000 ZZH R&B MED SURG/OB

## 2017-05-19 PROCEDURE — 25000128 H RX IP 250 OP 636: Performed by: INTERNAL MEDICINE

## 2017-05-19 PROCEDURE — 36415 COLL VENOUS BLD VENIPUNCTURE: CPT | Performed by: INTERNAL MEDICINE

## 2017-05-19 PROCEDURE — 83605 ASSAY OF LACTIC ACID: CPT | Performed by: PEDIATRICS

## 2017-05-19 RX ORDER — FUROSEMIDE 10 MG/ML
20 INJECTION INTRAMUSCULAR; INTRAVENOUS
Status: DISCONTINUED | OUTPATIENT
Start: 2017-05-19 | End: 2017-05-22 | Stop reason: HOSPADM

## 2017-05-19 RX ORDER — WARFARIN SODIUM 5 MG/1
5 TABLET ORAL
Status: DISCONTINUED | OUTPATIENT
Start: 2017-05-19 | End: 2017-05-19 | Stop reason: DRUGHIGH

## 2017-05-19 RX ORDER — BISACODYL 10 MG
10 SUPPOSITORY, RECTAL RECTAL DAILY PRN
Status: DISCONTINUED | OUTPATIENT
Start: 2017-05-19 | End: 2017-05-22 | Stop reason: HOSPADM

## 2017-05-19 RX ORDER — WARFARIN SODIUM 2.5 MG/1
2.5 TABLET ORAL
Status: COMPLETED | OUTPATIENT
Start: 2017-05-19 | End: 2017-05-19

## 2017-05-19 RX ORDER — POLYETHYLENE GLYCOL 3350 17 G/17G
17 POWDER, FOR SOLUTION ORAL DAILY
Status: DISCONTINUED | OUTPATIENT
Start: 2017-05-19 | End: 2017-05-20

## 2017-05-19 RX ADMIN — WARFARIN SODIUM 2.5 MG: 2.5 TABLET ORAL at 17:16

## 2017-05-19 RX ADMIN — POLYETHYLENE GLYCOL 3350 17 G: 17 POWDER, FOR SOLUTION ORAL at 20:04

## 2017-05-19 RX ADMIN — HYDROCODONE BITARTRATE AND ACETAMINOPHEN 2 TABLET: 5; 325 TABLET ORAL at 01:49

## 2017-05-19 RX ADMIN — ASPIRIN 81 MG 81 MG: 81 TABLET ORAL at 08:24

## 2017-05-19 RX ADMIN — INSULIN ASPART 1 UNITS: 100 INJECTION, SOLUTION INTRAVENOUS; SUBCUTANEOUS at 08:24

## 2017-05-19 RX ADMIN — TAZOBACTAM SODIUM AND PIPERACILLIN SODIUM 4.5 G: 500; 4 INJECTION, SOLUTION INTRAVENOUS at 20:05

## 2017-05-19 RX ADMIN — TAZOBACTAM SODIUM AND PIPERACILLIN SODIUM 4.5 G: 500; 4 INJECTION, SOLUTION INTRAVENOUS at 14:40

## 2017-05-19 RX ADMIN — TAZOBACTAM SODIUM AND PIPERACILLIN SODIUM 4.5 G: 500; 4 INJECTION, SOLUTION INTRAVENOUS at 02:05

## 2017-05-19 RX ADMIN — INSULIN ASPART 2 UNITS: 100 INJECTION, SOLUTION INTRAVENOUS; SUBCUTANEOUS at 17:17

## 2017-05-19 RX ADMIN — VANCOMYCIN HYDROCHLORIDE 2000 MG: 10 INJECTION, POWDER, LYOPHILIZED, FOR SOLUTION INTRAVENOUS at 21:53

## 2017-05-19 RX ADMIN — FUROSEMIDE 20 MG: 10 INJECTION, SOLUTION INTRAVENOUS at 15:40

## 2017-05-19 RX ADMIN — VANCOMYCIN HYDROCHLORIDE 2000 MG: 10 INJECTION, POWDER, LYOPHILIZED, FOR SOLUTION INTRAVENOUS at 09:24

## 2017-05-19 RX ADMIN — LISINOPRIL 10 MG: 10 TABLET ORAL at 08:24

## 2017-05-19 RX ADMIN — INSULIN ASPART 1 UNITS: 100 INJECTION, SOLUTION INTRAVENOUS; SUBCUTANEOUS at 12:21

## 2017-05-19 RX ADMIN — HYDROCODONE BITARTRATE AND ACETAMINOPHEN 2 TABLET: 5; 325 TABLET ORAL at 23:33

## 2017-05-19 RX ADMIN — TAZOBACTAM SODIUM AND PIPERACILLIN SODIUM 4.5 G: 500; 4 INJECTION, SOLUTION INTRAVENOUS at 08:24

## 2017-05-19 RX ADMIN — CARVEDILOL 3.12 MG: 3.12 TABLET, FILM COATED ORAL at 17:16

## 2017-05-19 RX ADMIN — CARVEDILOL 3.12 MG: 3.12 TABLET, FILM COATED ORAL at 08:24

## 2017-05-19 RX ADMIN — FUROSEMIDE 20 MG: 10 INJECTION, SOLUTION INTRAVENOUS at 12:21

## 2017-05-19 RX ADMIN — HYDROCODONE BITARTRATE AND ACETAMINOPHEN 2 TABLET: 5; 325 TABLET ORAL at 17:29

## 2017-05-19 RX ADMIN — ACETAMINOPHEN 650 MG: 325 TABLET ORAL at 15:09

## 2017-05-19 ASSESSMENT — PAIN DESCRIPTION - DESCRIPTORS: DESCRIPTORS: ACHING

## 2017-05-19 NOTE — PHARMACY-VANCOMYCIN DOSING SERVICE
Pharmacy Vancomycin Note  Date of Service May 19, 2017  Patient's  10/20/   66 year old, male    Indication: Sepsis  Goal Trough Level: 15-20 mg/L  Day of Therapy: 5  Current Vancomycin regimen:  2000 mg IV q12h    Current estimated CrCl = Estimated Creatinine Clearance: 127.6 mL/min (based on Cr of 0.89).    Creatinine for last 3 days  2017:  5:41 AM Creatinine 0.93 mg/dL  2017:  4:00 AM Creatinine 0.89 mg/dL    Recent Vancomycin Levels (past 3 days)  2017:  9:15 PM Vancomycin Level 14.1 mg/L    Vancomycin IV Administrations (past 72 hours)                   vancomycin (VANCOCIN) 2,000 mg in NaCl 0.9 % 500 mL intermittent infusion (mg) 2,000 mg New Bag 17 0924     2,000 mg New Bag 17 2123     2,000 mg New Bag  1144     2,000 mg New Bag 17 2248     2,000 mg New Bag  1037     2,000 mg New Bag 17 2225                Nephrotoxins and other renal medications (Future)    Start     Dose/Rate Route Frequency Ordered Stop    17 1215  furosemide (LASIX) injection 20 mg      20 mg Intravenous 2 TIMES DAILY. 17 1204      17 1000  vancomycin (VANCOCIN) 2,000 mg in NaCl 0.9 % 500 mL intermittent infusion      2,000 mg  200 mL/hr over 2 Hours Intravenous EVERY 12 HOURS 17 0934      17 0900  lisinopril (PRINIVIL/ZESTRIL) tablet 10 mg      10 mg Oral DAILY 17 0554      05/15/17 2000  piperacillin-tazobactam (ZOSYN) intermittent infusion 4.5 g      4.5 g  200 mL/hr over 30 Minutes Intravenous EVERY 6 HOURS 05/15/17 1920               Contrast Orders - past 72 hours     None        Has serum creatinine changed > 50% in last 72 hours: No    Urine output:  unable to determine    Renal Function: Stable    Plan:  1.  Continue Current Dose  2.  Pharmacy will check trough levels as appropriate in 1-3 Days.    3. Serum creatinine levels will be ordered daily for the first week of therapy and at least twice weekly for subsequent weeks.      Jaret Milian,  PharmD. Pharmacy Resident          .

## 2017-05-19 NOTE — PROVIDER NOTIFICATION
"S-(situation): patient concern for elevated weight    B-(background): sepsis, cellulitus    A-(assessment): patient alert and oriented. He is very concerned regarding a weight gain of \"45 pounds\" and he is concerned regarding his heart, patient's weights since admit: 149.2 kg admit, 5/17 150 kg, refused weight on 5/18, standing scale weight on 5/19 am 156.5 kg.   Patients lungs clear but diminished on room air with oxygen saturations > 92%.       R-(recommendations): Discussed with Dr Hernandez. Feels weight gain due to sepsis. Discussed this with patient on how the body works with fluid removal post infection and that his heart appears to be keeping up with the fluid as evidenced by him being on room air with little to no shortness of breath, his lungs are clear and his vitals signs are stable. Heart rate regular but slightly tachycardic at 100. Patient voiced understanding at this time but continues to be concerned regarding weight gain and his heart.     "

## 2017-05-19 NOTE — PROGRESS NOTES
[facility] Hospitalist Progress Note    Date of Service (when I saw the patient): 05/19/2017  Date of Admission: 5/15/2017     Assesment and Plan:   66 year old yo male who was admitted 5/15/2017 with right lower extremity cells leading to sepsis and acute encephalopathy and bacteremia. His sepsis has cleared. His blood markers have returned to normal but low-grade fever last night. He is now likely a little volume overloaded and I will treat with Lasix today. Waiting on cultures to return. We will narrow his antibiotics today.     Active Problems:    Sepsis (H)    Left leg cellulitis    Acute respiratory failure with hypoxia (H)    Acute encephalopathy    Atrial fibrillation with RVR (H)    Type 2 diabetes mellitus without complication (H)    Essential hypertension    Cardiomyopathy, unspecified (H) - secondary to rapid atrial flutter    Chronic combined systolic and diastolic congestive heart failure (H)    SONJA (obstructive sleep apnea)    Morbid obesity (H)    Pulmonary hypertension (H)    Chronic pain syndrome    Thrombocytopenia (H)       DVT Prophylaxis: Warfarin  Code Status: Full Code    Disposition: Expected discharge in 1-2 days     Luiz Tejeda     Interval History   Did well overnight.    -Data reviewed today: I reviewed all new labs and imaging results over the last 24 hours. I personally reviewed no images or EKG's today.    Physical Exam   Temp: 100.3  F (37.9  C) Temp src: Oral BP: 121/73 Pulse: 82 Heart Rate: 82 Resp: 28 SpO2: 92 % O2 Device: None (Room air)    Vitals:    05/16/17 0413 05/17/17 0615 05/19/17 0215   Weight: (!) 152 kg (335 lb 1.6 oz) (!) 150 kg (330 lb 11 oz) (!) 156.5 kg (345 lb 0.3 oz)     Vital Signs with Ranges  Temp:  [98.3  F (36.8  C)-100.7  F (38.2  C)] 100.3  F (37.9  C)  Pulse:  [] 82  Heart Rate:  [] 82  Resp:  [24-30] 28  BP: (107-148)/(61-97) 121/73  SpO2:  [91 %-97 %] 92 %  I/O last 3 completed shifts:  In: 2070 [P.O.:240; I.V.:1830]  Out: 1200  [Urine:1200]  wwell-appearing. Morbidly obese. Alert and oriented with mentatintact. Heart irregular. Lungs have bilateral radials with mild increased work of breathing and mild tachypnea.. Extremities have +2, +3 pitting edema bilaterally. The right lower extremity has erythema from the toes up to the mid thigh. Skin is intact. This was wrapped Ace.    Medications     - MEDICATION INSTRUCTIONS -       Warfarin Therapy Reminder         carvedilol (COREG) tablet 3.125 mg  3.125 mg Oral BID w/meals     lisinopril (PRINIVIL/ZESTRIL) tablet 10 mg  10 mg Oral Daily     vancomycin (VANCOCIN) IV  2,000 mg Intravenous Q12H     aspirin chewable tablet 81 mg  81 mg Oral Daily     insulin aspart  1-7 Units Subcutaneous TID AC     insulin aspart  1-5 Units Subcutaneous At Bedtime     piperacillin-tazobactam  4.5 g Intravenous Q6H       Data     Recent Labs  Lab 05/19/17  0538 05/18/17  0400 05/17/17  0541 05/16/17  0528  05/15/17  1250   WBC  --  9.7 11.7* 15.1*  --  16.1*   HGB  --  12.3* 12.7* 13.0*  --  15.4   MCV  --  94 93 92  --  91   PLT  --  109* 98* 102*  --  153   INR 2.39* 1.80* 2.30* 3.44*  < >  --    NA  --  143 142 143  --  140   POTASSIUM  --  4.3 4.1 4.2  --  5.0   CHLORIDE  --  108 109 108  --  103   CO2  --  28 24 24  --  30   BUN  --  17 21 23  --  22   CR  --  0.89 0.93 1.05  --  1.09   ANIONGAP  --  7 9 11  --  7   JOSE  --  8.4* 7.9* 7.5*  --  8.8   GLC  --  185* 188* 214*  --  180*   ALBUMIN  --   --   --  2.7*  --  3.8   PROTTOTAL  --   --   --  5.6*  --  7.5   BILITOTAL  --   --   --  0.9  --  0.8   ALKPHOS  --   --   --  68  --  111   ALT  --   --   --  34  --  51   AST  --   --   --  20  --  40   < > = values in this interval not displayed.    Recent Results (from the past 24 hour(s))   US Lower Extremity Venous Duplex Right    Narrative    ULTRASOUND LOWER EXTREMITY VENOUS DUPLEX RIGHT   5/18/2017 3:21 PM     HISTORY: Leg swelling, redness. Evaluate for abscess or deep venous  thrombosis. Patient has  right leg cellulitis.    COMPARISON: None.    FINDINGS: Gray-scale, color and Doppler spectral analysis ultrasound  was performed of the right lower extremity. Compression and  augmentation imaging was performed.    The deep venous system of the right lower extremity is fully  compressible. Spectral Doppler demonstrates normal phasicity and  excellent augmentation with calf compression.  Visualized greater  saphenous and deep calf veins are patent.    Targeted ultrasound of the area of symptoms in the right calf, where  it is red and swollen, demonstrates subcutaneous edema but no other  sonographic abnormality.      Impression    IMPRESSION:   1. No evidence for DVT within the right lower extremity.  2. Subcutaneous edema is seen in the tissues of the right medial calf  area of edema and redness. Consistent with patient's known cellulitis.  No abscess.    FAYE BOWERS MD

## 2017-05-19 NOTE — PLAN OF CARE
Problem: Goal Outcome Summary  Goal: Goal Outcome Summary  Outcome: No Change  S-(situation): end of shift note     B-(background): cellulitis of right leg     A-(assessment): Heart rate and respiratory rate were elevated when vital signs were checked and each time was after pt was doing some activity (sitting at the edge of chair to void and walking in the halls).  Pt's weight is up, IV was saline locked after antibiotic was finished infusing; see Radha's note on provider notification.  When walking in the halls, needed to take a break after about 50 feet due to pain in right leg, medicated with Norco with some relief.      R-(recommendations): Continue to monitor for pain, encourage pt to discuss concerns with MD.

## 2017-05-19 NOTE — PHARMACY-ANTICOAGULATION SERVICE
Clinical Pharmacy -Warfarin Note     Indication: Atrial Fibrillation  Therapy Goal: INR 2-3  Warfarin Prior to Admission: Yes  Warfarin PTA Regimen: 5mg daily per patient  Significant Drug Interactions: Aspirin, norco, zosyn, vancomycin    INR   Date Value Ref Range Status   05/19/2017 2.39 (H) 0.86 - 1.14 Final   05/18/2017 1.80 (H) 0.86 - 1.14 Final       Recommend warfarin 2.5 mg today.  Pharmacy will monitor Last GUY Lenin daily and order warfarin doses to achieve specified goal.      Please contact pharmacy as soon as possible if the warfarin needs to be held for a procedure or if the warfarin goals change.      Jaret Milian, PharmD. Pharmacy Resident

## 2017-05-19 NOTE — PLAN OF CARE
Problem: Goal Outcome Summary  Goal: Goal Outcome Summary  Outcome: No Change  Pt is alert and oriented. Vitals stable, afebrile. Right leg is still bright red and swollen. Pt reports that the pain has improved. Norco given x1. Pt ambulated in hallway x1, pt needed walker to help with ambulation. Pt was able to ambulate down hallway and back with stand-by assist but was fatigued and needed to rest when he returned to his room.  Pt declined ambulating again, but stated he will try to ambulate at least 4 times tomorrow.

## 2017-05-19 NOTE — PLAN OF CARE
Problem: Goal Outcome Summary  Goal: Goal Outcome Summary  Outcome: No Change  tmax 103 treated with tylenol recheck 100.8, up with assist of one and walker, walked halls x1, lung sounds diminished, started on IV lasix today BID frequent urination some incontinent, bilateral LE compression ace wrapped, right LE red/pink waxen in color and hot to touch w/o any open areas, numbness/tingling in RLE, awaiting BC sensitivities

## 2017-05-20 ENCOUNTER — APPOINTMENT (OUTPATIENT)
Dept: GENERAL RADIOLOGY | Facility: CLINIC | Age: 67
DRG: 871 | End: 2017-05-20
Attending: FAMILY MEDICINE
Payer: MEDICARE

## 2017-05-20 LAB
ALBUMIN UR-MCNC: 30 MG/DL
ANION GAP SERPL CALCULATED.3IONS-SCNC: 8 MMOL/L (ref 3–14)
APPEARANCE UR: CLEAR
BACTERIA SPEC CULT: ABNORMAL
BILIRUB UR QL STRIP: NEGATIVE
BUN SERPL-MCNC: 13 MG/DL (ref 7–30)
CALCIUM SERPL-MCNC: 8.3 MG/DL (ref 8.5–10.1)
CHLORIDE SERPL-SCNC: 105 MMOL/L (ref 94–109)
CO2 SERPL-SCNC: 28 MMOL/L (ref 20–32)
COLOR UR AUTO: ABNORMAL
CREAT SERPL-MCNC: 0.87 MG/DL (ref 0.66–1.25)
ERYTHROCYTE [DISTWIDTH] IN BLOOD BY AUTOMATED COUNT: 14.9 % (ref 10–15)
GFR SERPL CREATININE-BSD FRML MDRD: 88 ML/MIN/1.7M2
GLUCOSE BLDC GLUCOMTR-MCNC: 177 MG/DL (ref 70–99)
GLUCOSE BLDC GLUCOMTR-MCNC: 190 MG/DL (ref 70–99)
GLUCOSE BLDC GLUCOMTR-MCNC: 197 MG/DL (ref 70–99)
GLUCOSE BLDC GLUCOMTR-MCNC: 201 MG/DL (ref 70–99)
GLUCOSE BLDC GLUCOMTR-MCNC: 227 MG/DL (ref 70–99)
GLUCOSE SERPL-MCNC: 197 MG/DL (ref 70–99)
GLUCOSE UR STRIP-MCNC: >499 MG/DL
HCT VFR BLD AUTO: 38.9 % (ref 40–53)
HGB BLD-MCNC: 12 G/DL (ref 13.3–17.7)
HGB UR QL STRIP: ABNORMAL
INR PPP: 3 (ref 0.86–1.14)
KETONES UR STRIP-MCNC: NEGATIVE MG/DL
LACTATE BLD-SCNC: 2 MMOL/L (ref 0.7–2.1)
LEUKOCYTE ESTERASE UR QL STRIP: NEGATIVE
Lab: 806
MCH RBC QN AUTO: 28.8 PG (ref 26.5–33)
MCHC RBC AUTO-ENTMCNC: 30.8 G/DL (ref 31.5–36.5)
MCV RBC AUTO: 94 FL (ref 78–100)
MICRO REPORT STATUS: ABNORMAL
MICROORGANISM SPEC CULT: ABNORMAL
MUCOUS THREADS #/AREA URNS LPF: PRESENT /LPF
NITRATE UR QL: NEGATIVE
PH UR STRIP: 5 PH (ref 5–7)
PLATELET # BLD AUTO: 148 10E9/L (ref 150–450)
POTASSIUM SERPL-SCNC: 4.3 MMOL/L (ref 3.4–5.3)
RBC # BLD AUTO: 4.16 10E12/L (ref 4.4–5.9)
RBC #/AREA URNS AUTO: 4 /HPF (ref 0–2)
SODIUM SERPL-SCNC: 141 MMOL/L (ref 133–144)
SP GR UR STRIP: 1.02 (ref 1–1.03)
SPECIMEN SOURCE: ABNORMAL
URN SPEC COLLECT METH UR: ABNORMAL
UROBILINOGEN UR STRIP-MCNC: 2 MG/DL (ref 0–2)
WBC # BLD AUTO: 12 10E9/L (ref 4–11)
WBC #/AREA URNS AUTO: 1 /HPF (ref 0–2)

## 2017-05-20 PROCEDURE — 36415 COLL VENOUS BLD VENIPUNCTURE: CPT | Performed by: FAMILY MEDICINE

## 2017-05-20 PROCEDURE — 25000128 H RX IP 250 OP 636: Performed by: FAMILY MEDICINE

## 2017-05-20 PROCEDURE — 85027 COMPLETE CBC AUTOMATED: CPT | Performed by: INTERNAL MEDICINE

## 2017-05-20 PROCEDURE — 12000000 ZZH R&B MED SURG/OB

## 2017-05-20 PROCEDURE — 83605 ASSAY OF LACTIC ACID: CPT | Performed by: PEDIATRICS

## 2017-05-20 PROCEDURE — 85610 PROTHROMBIN TIME: CPT | Performed by: INTERNAL MEDICINE

## 2017-05-20 PROCEDURE — 36415 COLL VENOUS BLD VENIPUNCTURE: CPT | Performed by: PEDIATRICS

## 2017-05-20 PROCEDURE — 99232 SBSQ HOSP IP/OBS MODERATE 35: CPT | Performed by: FAMILY MEDICINE

## 2017-05-20 PROCEDURE — A9270 NON-COVERED ITEM OR SERVICE: HCPCS | Mod: GY | Performed by: INTERNAL MEDICINE

## 2017-05-20 PROCEDURE — 99207 ZZC MOONLIGHTING INDICATOR: CPT | Performed by: FAMILY MEDICINE

## 2017-05-20 PROCEDURE — 25000128 H RX IP 250 OP 636: Performed by: PEDIATRICS

## 2017-05-20 PROCEDURE — 36415 COLL VENOUS BLD VENIPUNCTURE: CPT | Performed by: INTERNAL MEDICINE

## 2017-05-20 PROCEDURE — A9270 NON-COVERED ITEM OR SERVICE: HCPCS | Mod: GY | Performed by: FAMILY MEDICINE

## 2017-05-20 PROCEDURE — 80048 BASIC METABOLIC PNL TOTAL CA: CPT | Performed by: INTERNAL MEDICINE

## 2017-05-20 PROCEDURE — 25000128 H RX IP 250 OP 636: Performed by: INTERNAL MEDICINE

## 2017-05-20 PROCEDURE — 25000132 ZZH RX MED GY IP 250 OP 250 PS 637: Mod: GY | Performed by: FAMILY MEDICINE

## 2017-05-20 PROCEDURE — A9270 NON-COVERED ITEM OR SERVICE: HCPCS | Mod: GY | Performed by: PEDIATRICS

## 2017-05-20 PROCEDURE — 71010 XR CHEST PORT 1 VW: CPT | Mod: TC

## 2017-05-20 PROCEDURE — 25000132 ZZH RX MED GY IP 250 OP 250 PS 637: Mod: GY | Performed by: INTERNAL MEDICINE

## 2017-05-20 PROCEDURE — 00000146 ZZHCL STATISTIC GLUCOSE BY METER IP

## 2017-05-20 PROCEDURE — 81001 URINALYSIS AUTO W/SCOPE: CPT | Performed by: PEDIATRICS

## 2017-05-20 PROCEDURE — 87040 BLOOD CULTURE FOR BACTERIA: CPT | Performed by: FAMILY MEDICINE

## 2017-05-20 PROCEDURE — 25000132 ZZH RX MED GY IP 250 OP 250 PS 637: Mod: GY | Performed by: PEDIATRICS

## 2017-05-20 RX ORDER — HYDROCODONE BITARTRATE AND ACETAMINOPHEN 5; 325 MG/1; MG/1
2 TABLET ORAL EVERY 4 HOURS PRN
Status: DISCONTINUED | OUTPATIENT
Start: 2017-05-20 | End: 2017-05-22 | Stop reason: HOSPADM

## 2017-05-20 RX ORDER — CEFTRIAXONE 2 G/1
2 INJECTION, POWDER, FOR SOLUTION INTRAMUSCULAR; INTRAVENOUS EVERY 24 HOURS
Status: DISCONTINUED | OUTPATIENT
Start: 2017-05-20 | End: 2017-05-22 | Stop reason: HOSPADM

## 2017-05-20 RX ORDER — POLYETHYLENE GLYCOL 3350 17 G/17G
17 POWDER, FOR SOLUTION ORAL 2 TIMES DAILY
Status: DISCONTINUED | OUTPATIENT
Start: 2017-05-20 | End: 2017-05-22 | Stop reason: HOSPADM

## 2017-05-20 RX ORDER — AMOXICILLIN 250 MG
2 CAPSULE ORAL 2 TIMES DAILY
Status: DISCONTINUED | OUTPATIENT
Start: 2017-05-20 | End: 2017-05-22 | Stop reason: HOSPADM

## 2017-05-20 RX ADMIN — LISINOPRIL 10 MG: 10 TABLET ORAL at 08:24

## 2017-05-20 RX ADMIN — SENNOSIDES AND DOCUSATE SODIUM 2 TABLET: 8.6; 5 TABLET ORAL at 21:10

## 2017-05-20 RX ADMIN — TAZOBACTAM SODIUM AND PIPERACILLIN SODIUM 4.5 G: 500; 4 INJECTION, SOLUTION INTRAVENOUS at 01:15

## 2017-05-20 RX ADMIN — ASPIRIN 81 MG 81 MG: 81 TABLET ORAL at 08:24

## 2017-05-20 RX ADMIN — CARVEDILOL 3.12 MG: 3.12 TABLET, FILM COATED ORAL at 08:24

## 2017-05-20 RX ADMIN — WARFARIN SODIUM 0.5 MG: 1 TABLET ORAL at 17:25

## 2017-05-20 RX ADMIN — HYDROCODONE BITARTRATE AND ACETAMINOPHEN 2 TABLET: 5; 325 TABLET ORAL at 09:37

## 2017-05-20 RX ADMIN — HYDROCODONE BITARTRATE AND ACETAMINOPHEN 2 TABLET: 5; 325 TABLET ORAL at 15:02

## 2017-05-20 RX ADMIN — CEFTRIAXONE SODIUM 2 G: 2 INJECTION, POWDER, FOR SOLUTION INTRAMUSCULAR; INTRAVENOUS at 12:52

## 2017-05-20 RX ADMIN — VANCOMYCIN HYDROCHLORIDE 2000 MG: 10 INJECTION, POWDER, LYOPHILIZED, FOR SOLUTION INTRAVENOUS at 09:37

## 2017-05-20 RX ADMIN — FUROSEMIDE 20 MG: 10 INJECTION, SOLUTION INTRAVENOUS at 08:24

## 2017-05-20 RX ADMIN — CARVEDILOL 3.12 MG: 3.12 TABLET, FILM COATED ORAL at 17:25

## 2017-05-20 RX ADMIN — INSULIN ASPART 2 UNITS: 100 INJECTION, SOLUTION INTRAVENOUS; SUBCUTANEOUS at 17:25

## 2017-05-20 RX ADMIN — TAZOBACTAM SODIUM AND PIPERACILLIN SODIUM 4.5 G: 500; 4 INJECTION, SOLUTION INTRAVENOUS at 08:29

## 2017-05-20 RX ADMIN — INSULIN ASPART 1 UNITS: 100 INJECTION, SOLUTION INTRAVENOUS; SUBCUTANEOUS at 08:25

## 2017-05-20 RX ADMIN — POLYETHYLENE GLYCOL 3350 17 G: 17 POWDER, FOR SOLUTION ORAL at 08:24

## 2017-05-20 RX ADMIN — POLYETHYLENE GLYCOL 3350 17 G: 17 POWDER, FOR SOLUTION ORAL at 21:11

## 2017-05-20 RX ADMIN — FUROSEMIDE 20 MG: 10 INJECTION, SOLUTION INTRAVENOUS at 16:02

## 2017-05-20 NOTE — PROGRESS NOTES
[facility] Hospitalist Progress Note    Date of Service (when I saw the patient): 05/20/2017  Date of Admission: 5/15/2017     Assesment and Plan:   66 year old yo male who was admitted 5/15/2017 with sepsis from right lower leg cellulitis. He also developed bacteremia with strep group G. Awaiting sensitivities. Sepsis had cleared, although fever returned yesterday. White blood cell nt also increasing. He still feels well with no new complaints. His leg continues to be tender.    Plan: Discussed with infectious disease, looking for potential seeding of the bacteremia. He has no new complaints or doubt this. No murmur on exam. No endo-grafts. No prosthetic joints. This could more likely be drug fever. Will narrow his antibiotics. DC vancomycin and Zosyn. Start ceftriaxone. Continue to narrow based on sensitivities. Reculture. Still remains a bit volume overloaded and will diurese.    Active Problems:    Sepsis (H)    Left leg cellulitis    Acute respiratory failure with hypoxia (H)    Acute encephalopathy    Atrial fibrillation with RVR (H)    Type 2 diabetes mellitus without complication (H)    Essential hypertension    Cardiomyopathy, unspecified (H) - secondary to rapid atrial flutter    Chronic combined systolic and diastolic congestive heart failure (H)    SONJA (obstructive sleep apnea)    Morbid obesity (H)    Pulmonary hypertension (H)    Chronic pain syndrome    Thrombocytopenia (H)    Gram-positive bacteremia       DVT Prophylaxis: Warfarin  Code Status: Full Code    Disposition: Expected discharge in 2-3 days     Falguni REG ElizabethIleana     Interval History   No new complaints. Febrile yesterday. Tolerating regular diet. Voiding adequately.    -Data reviewed today: I reviewed all new labs and imaging results over the last 24 hours. I personally reviewed no images or EKG's today.    Physical Exam   Temp: 99.1  F (37.3  C) Temp src: Oral BP: 111/77 Pulse: 101 Heart Rate: 90 Resp: 26 SpO2: 91 % O2 Device:  None (Room air)    Vitals:    05/17/17 0615 05/19/17 0215 05/20/17 0426   Weight: (!) 150 kg (330 lb 11 oz) (!) 156.5 kg (345 lb 0.3 oz) (!) 151 kg (332 lb 14.3 oz)     Vital Signs with Ranges  Temp:  [98.1  F (36.7  C)-103  F (39.4  C)] 99.1  F (37.3  C)  Pulse:  [] 101  Heart Rate:  [] 90  Resp:  [24-36] 26  BP: ()/(53-97) 111/77  SpO2:  [90 %-93 %] 91 %  I/O last 3 completed shifts:  In: 1460 [P.O.:760; I.V.:700]  Out: 1050 [Urine:1050]    Mildly acutely ill appearing. Nontoxic. Alert and oriented. Mentation intact. Heart irregular. No murmur. Lungs clear bilaterally. Abdomen obese but nontender. Bilateral lower extremities with +2 pitting edema. Right lower extremity with erythema from the toe to the upper thigh. Tender in the mid calf, but not other areas.Neurologically nonfocal.    Medications     - MEDICATION INSTRUCTIONS -       Warfarin Therapy Reminder         sodium chloride (PF)  3 mL Intracatheter Q8H     warfarin  0.5 mg Oral ONCE at 18:00     cefTRIAXone  2 g Intravenous Q24H     furosemide  20 mg Intravenous BID     polyethylene glycol  17 g Oral Daily     carvedilol (COREG) tablet 3.125 mg  3.125 mg Oral BID w/meals     lisinopril (PRINIVIL/ZESTRIL) tablet 10 mg  10 mg Oral Daily     aspirin chewable tablet 81 mg  81 mg Oral Daily     insulin aspart  1-7 Units Subcutaneous TID AC     insulin aspart  1-5 Units Subcutaneous At Bedtime       Data     Recent Labs  Lab 05/20/17  0620 05/19/17  1351 05/19/17  0538 05/18/17  0400 05/17/17  0541 05/16/17  0528  05/15/17  1250   WBC 12.0* 10.4  --  9.7 11.7* 15.1*  --  16.1*   HGB 12.0* 13.1*  --  12.3* 12.7* 13.0*  --  15.4   MCV 94 92  --  94 93 92  --  91   * 127*  --  109* 98* 102*  --  153   INR 3.00*  --  2.39* 1.80* 2.30* 3.44*  < >  --      --   --  143 142 143  --  140   POTASSIUM 4.3  --   --  4.3 4.1 4.2  --  5.0   CHLORIDE 105  --   --  108 109 108  --  103   CO2 28  --   --  28 24 24  --  30   BUN 13  --   --  17  21 23  --  22   CR 0.87  --   --  0.89 0.93 1.05  --  1.09   ANIONGAP 8  --   --  7 9 11  --  7   JOSE 8.3*  --   --  8.4* 7.9* 7.5*  --  8.8   *  --   --  185* 188* 214*  --  180*   ALBUMIN  --   --   --   --   --  2.7*  --  3.8   PROTTOTAL  --   --   --   --   --  5.6*  --  7.5   BILITOTAL  --   --   --   --   --  0.9  --  0.8   ALKPHOS  --   --   --   --   --  68  --  111   ALT  --   --   --   --   --  34  --  51   AST  --   --   --   --   --  20  --  40   < > = values in this interval not displayed.    No results found for this or any previous visit (from the past 24 hour(s)).

## 2017-05-20 NOTE — PLAN OF CARE
Problem: Goal Outcome Summary  Goal: Goal Outcome Summary  Outcome: No Change  Afebrile during the night.  Right lower leg continues to be red,painful.  Pt refuses to have ace wrap on right leg.  Medicated with norco which helps.  Stands to void, up with 2 assist and walker.  Bed and chair alarm used.

## 2017-05-20 NOTE — PLAN OF CARE
Problem: Goal Outcome Summary  Goal: Goal Outcome Summary  Outcome: No Change  Low grade temp in morning, right LE red/hot/+4 edema ace wrapped from knee to toes, no open areas to right leg, repeat blood cultures drawn, ua/uc in process, up with assist of 2 to chair, in bed most of the day with right leg elevated with pillows, lung sounds diminished, new IV placed in right lower arm w/o difficulty, zosyn and vanco stopped started on rocephin, pain controlled with norco, started on bowel program for constipation, adequate oral intake, continue with IV lasix diuresing

## 2017-05-20 NOTE — PHARMACY-ANTICOAGULATION SERVICE
.  Clinical Pharmacy - Warfarin Dosing Consult     Pharmacy has been consulted to manage this patient s warfarin therapy.  Indication: Atrial Fibrillation  Therapy Goal: INR 2-3  Warfarin Prior to Admission: Yes  Warfarin PTA Regimen: 5mg daily per patient  Medication Interactions: acetaminophen, aspirin, hydrocodone/acetaminophen, piperacillin/tazobactam, vancomycin    INR   Date Value Ref Range Status   05/20/2017 3.00 (H) 0.86 - 1.14 Final   05/19/2017 2.39 (H) 0.86 - 1.14 Final       Recommend warfarin 0.5 mg today, due to a 0.6 increase in 24 hours.  Pharmacy will monitor Last Phelps daily and order warfarin doses to achieve specified goal.      Please contact pharmacy as soon as possible if the warfarin needs to be held for a procedure or if the warfarin goals change.

## 2017-05-21 PROBLEM — L03.115 CELLULITIS OF RIGHT LEG: Status: ACTIVE | Noted: 2017-05-15

## 2017-05-21 LAB
ANION GAP SERPL CALCULATED.3IONS-SCNC: 8 MMOL/L (ref 3–14)
BASOPHILS # BLD AUTO: 0.1 10E9/L (ref 0–0.2)
BASOPHILS NFR BLD AUTO: 0.5 %
BUN SERPL-MCNC: 14 MG/DL (ref 7–30)
CALCIUM SERPL-MCNC: 8.5 MG/DL (ref 8.5–10.1)
CHLORIDE SERPL-SCNC: 106 MMOL/L (ref 94–109)
CO2 SERPL-SCNC: 28 MMOL/L (ref 20–32)
CREAT SERPL-MCNC: 0.73 MG/DL (ref 0.66–1.25)
DIFFERENTIAL METHOD BLD: ABNORMAL
EOSINOPHIL # BLD AUTO: 0.1 10E9/L (ref 0–0.7)
EOSINOPHIL NFR BLD AUTO: 1.3 %
ERYTHROCYTE [DISTWIDTH] IN BLOOD BY AUTOMATED COUNT: 14.9 % (ref 10–15)
GFR SERPL CREATININE-BSD FRML MDRD: ABNORMAL ML/MIN/1.7M2
GLUCOSE BLDC GLUCOMTR-MCNC: 165 MG/DL (ref 70–99)
GLUCOSE BLDC GLUCOMTR-MCNC: 193 MG/DL (ref 70–99)
GLUCOSE BLDC GLUCOMTR-MCNC: 230 MG/DL (ref 70–99)
GLUCOSE BLDC GLUCOMTR-MCNC: 262 MG/DL (ref 70–99)
GLUCOSE BLDC GLUCOMTR-MCNC: 275 MG/DL (ref 70–99)
GLUCOSE SERPL-MCNC: 185 MG/DL (ref 70–99)
HCT VFR BLD AUTO: 38.8 % (ref 40–53)
HGB BLD-MCNC: 11.9 G/DL (ref 13.3–17.7)
IMM GRANULOCYTES # BLD: 0.2 10E9/L (ref 0–0.4)
IMM GRANULOCYTES NFR BLD: 1.5 %
INR PPP: 3.32 (ref 0.86–1.14)
LYMPHOCYTES # BLD AUTO: 0.8 10E9/L (ref 0.8–5.3)
LYMPHOCYTES NFR BLD AUTO: 7.4 %
MAGNESIUM SERPL-MCNC: 2.2 MG/DL (ref 1.6–2.3)
MCH RBC QN AUTO: 28.9 PG (ref 26.5–33)
MCHC RBC AUTO-ENTMCNC: 30.7 G/DL (ref 31.5–36.5)
MCV RBC AUTO: 94 FL (ref 78–100)
MONOCYTES # BLD AUTO: 0.9 10E9/L (ref 0–1.3)
MONOCYTES NFR BLD AUTO: 8.4 %
NEUTROPHILS # BLD AUTO: 8.9 10E9/L (ref 1.6–8.3)
NEUTROPHILS NFR BLD AUTO: 80.9 %
PLATELET # BLD AUTO: 174 10E9/L (ref 150–450)
POTASSIUM SERPL-SCNC: 4.4 MMOL/L (ref 3.4–5.3)
RBC # BLD AUTO: 4.12 10E12/L (ref 4.4–5.9)
SODIUM SERPL-SCNC: 142 MMOL/L (ref 133–144)
WBC # BLD AUTO: 11 10E9/L (ref 4–11)

## 2017-05-21 PROCEDURE — 25000128 H RX IP 250 OP 636: Performed by: FAMILY MEDICINE

## 2017-05-21 PROCEDURE — A9270 NON-COVERED ITEM OR SERVICE: HCPCS | Mod: GY | Performed by: INTERNAL MEDICINE

## 2017-05-21 PROCEDURE — 12000000 ZZH R&B MED SURG/OB

## 2017-05-21 PROCEDURE — 83735 ASSAY OF MAGNESIUM: CPT | Performed by: INTERNAL MEDICINE

## 2017-05-21 PROCEDURE — 25000132 ZZH RX MED GY IP 250 OP 250 PS 637: Mod: GY | Performed by: FAMILY MEDICINE

## 2017-05-21 PROCEDURE — 25000132 ZZH RX MED GY IP 250 OP 250 PS 637: Mod: GY | Performed by: INTERNAL MEDICINE

## 2017-05-21 PROCEDURE — 99207 ZZC MOONLIGHTING INDICATOR: CPT | Performed by: FAMILY MEDICINE

## 2017-05-21 PROCEDURE — 00000146 ZZHCL STATISTIC GLUCOSE BY METER IP

## 2017-05-21 PROCEDURE — 85025 COMPLETE CBC W/AUTO DIFF WBC: CPT | Performed by: INTERNAL MEDICINE

## 2017-05-21 PROCEDURE — 80048 BASIC METABOLIC PNL TOTAL CA: CPT | Performed by: INTERNAL MEDICINE

## 2017-05-21 PROCEDURE — A9270 NON-COVERED ITEM OR SERVICE: HCPCS | Mod: GY | Performed by: FAMILY MEDICINE

## 2017-05-21 PROCEDURE — 99232 SBSQ HOSP IP/OBS MODERATE 35: CPT | Performed by: FAMILY MEDICINE

## 2017-05-21 PROCEDURE — 36415 COLL VENOUS BLD VENIPUNCTURE: CPT | Performed by: INTERNAL MEDICINE

## 2017-05-21 PROCEDURE — 85610 PROTHROMBIN TIME: CPT | Performed by: INTERNAL MEDICINE

## 2017-05-21 RX ORDER — IPRATROPIUM BROMIDE AND ALBUTEROL SULFATE 2.5; .5 MG/3ML; MG/3ML
3 SOLUTION RESPIRATORY (INHALATION) EVERY 4 HOURS PRN
Status: DISCONTINUED | OUTPATIENT
Start: 2017-05-21 | End: 2017-05-22 | Stop reason: HOSPADM

## 2017-05-21 RX ORDER — METOPROLOL TARTRATE 50 MG
50 TABLET ORAL 2 TIMES DAILY
Status: DISCONTINUED | OUTPATIENT
Start: 2017-05-21 | End: 2017-05-22 | Stop reason: HOSPADM

## 2017-05-21 RX ADMIN — METOPROLOL TARTRATE 50 MG: 50 TABLET, FILM COATED ORAL at 21:06

## 2017-05-21 RX ADMIN — POLYETHYLENE GLYCOL 3350 17 G: 17 POWDER, FOR SOLUTION ORAL at 21:06

## 2017-05-21 RX ADMIN — FUROSEMIDE 20 MG: 10 INJECTION, SOLUTION INTRAVENOUS at 16:35

## 2017-05-21 RX ADMIN — HYDROCODONE BITARTRATE AND ACETAMINOPHEN 2 TABLET: 5; 325 TABLET ORAL at 18:26

## 2017-05-21 RX ADMIN — INSULIN ASPART 1 UNITS: 100 INJECTION, SOLUTION INTRAVENOUS; SUBCUTANEOUS at 08:20

## 2017-05-21 RX ADMIN — CARVEDILOL 3.12 MG: 3.12 TABLET, FILM COATED ORAL at 08:12

## 2017-05-21 RX ADMIN — HYDROCODONE BITARTRATE AND ACETAMINOPHEN 2 TABLET: 5; 325 TABLET ORAL at 08:12

## 2017-05-21 RX ADMIN — SENNOSIDES AND DOCUSATE SODIUM 2 TABLET: 8.6; 5 TABLET ORAL at 08:12

## 2017-05-21 RX ADMIN — INSULIN ASPART 3 UNITS: 100 INJECTION, SOLUTION INTRAVENOUS; SUBCUTANEOUS at 12:55

## 2017-05-21 RX ADMIN — ASPIRIN 81 MG 81 MG: 81 TABLET ORAL at 08:12

## 2017-05-21 RX ADMIN — METOPROLOL TARTRATE 50 MG: 50 TABLET, FILM COATED ORAL at 12:40

## 2017-05-21 RX ADMIN — HYDROCODONE BITARTRATE AND ACETAMINOPHEN 2 TABLET: 5; 325 TABLET ORAL at 12:39

## 2017-05-21 RX ADMIN — CEFTRIAXONE SODIUM 2 G: 2 INJECTION, POWDER, FOR SOLUTION INTRAMUSCULAR; INTRAVENOUS at 12:39

## 2017-05-21 RX ADMIN — CARVEDILOL 3.12 MG: 3.12 TABLET, FILM COATED ORAL at 18:23

## 2017-05-21 RX ADMIN — INSULIN ASPART 3 UNITS: 100 INJECTION, SOLUTION INTRAVENOUS; SUBCUTANEOUS at 16:56

## 2017-05-21 RX ADMIN — FUROSEMIDE 20 MG: 10 INJECTION, SOLUTION INTRAVENOUS at 08:12

## 2017-05-21 RX ADMIN — LISINOPRIL 10 MG: 10 TABLET ORAL at 08:12

## 2017-05-21 RX ADMIN — SENNOSIDES AND DOCUSATE SODIUM 2 TABLET: 8.6; 5 TABLET ORAL at 21:06

## 2017-05-21 RX ADMIN — POLYETHYLENE GLYCOL 3350 17 G: 17 POWDER, FOR SOLUTION ORAL at 08:12

## 2017-05-21 NOTE — PLAN OF CARE
Problem: Goal Outcome Summary  Goal: Goal Outcome Summary  Outcome: Improving  Patient walked halls x2, moving with standby assist and walker, right leg red/hot/ +4 edema has numbness/tingling in foot, right upper thigh and hip weeping, lung sounds diminished, appetite increased, bowel movement x1

## 2017-05-21 NOTE — PROGRESS NOTES
[facility] Hospitalist Progress Note    Date of Service (when I saw the patient): 05/21/2017  Date of Admission: 5/15/2017     This patient was seen and examined by myself as well as the medical student.  The medical student scribed the note and I have reviewed it, edited it appropriately, and agree with the final documentation.       Cellulitis slow to improve, but expected for strep. It is pan sensitive. Was kept for an extra few days due to fever, which resolved with removal of vanco (?drug related). Anticipate d/c tomorrow with orals. Stressed importance of compression, elevation.  Electronically signed by:  Luiz Tejeda MD   5/21/2017     Assesment and Plan:   66 year old yo morbidly obese male who was admitted 5/15/2017 with sepsis, acute respiratory failure with hypoxia, and acute encephalopathy found to have right lower leg cellulitis and group G strep bacteremia. He was treated aggressively with IV vancomycin and zosyn. WBC and fevers improved but did spike overnight on 5/19/17. CXR was negative for any signs of infection and new blood cultures obtained have been negative; discussed case with infectious disease and without evidence of new seeding of bacteria, suspect drug fever. Antibiotics narrowed from vanc/zosyn to ceftriaxone 5/20/17 and patient has been afebrile with negative blood cultures since.    1. Cellulitis of right leg: In the setting of chronic lower extremity edema likely due to a combination of CHF, pulmonary hypertension, and venous insufficiency with resultant lymphedema. Seems to be improving, patient has been afebrile for the past 36 hours with Tmax of 100.3 F overnight. WBC has normalized and blood cultures drawn 5/20/17 with no growth to date. On exam, erythema extends to right groin but today there is normalization of the skin of the foot and several patches which are lightening over the knee.  - Continue on ceftriaxone 2 g Q24H  - Consider placement of PICC line for  outpatient IV antibiotics  - ACE bandage to right leg daily  - Continue to encourage activity  - Will place PT referral upon discharge for treatment of chronic LE edema as outpatient following resolution of cellulitis  - CBC, CRP, and BMP in AM    2. Wheezing: Patient noted to have slight expiratory wheeze on exam. No respiratory distress. No history of asthma or COPD. CXR 5/20/17 was negative for any acute airspace disease. Consider acute bronchospasm or mucus plugging. May also be secondary to airway obstruction from patient's morbid obesity.   - Consider Duoneb Q4H PRN for wheezing  - Continue to monitor O2 sats for any evidence of hypoxia    3. Atrial fibrillation with RVR: Heart rate has been stable. Irregularly irregular rhythm on exam.  - Continue with carvedilol 3.125 mg BID for rate control, restart metoprolol  - Aspirin 81 mg daily  - On warfarin, pharmacy to dose     4. 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 Last ECHO October 2016 revealed EF of 53% with severe LVH and moderate tricuspid insufficiency. Weight at last cardiology visit 2/10/2017 was 324 lbs. Weight on admission was 329 lbs.  - Continue with furosemide, lisinopril, and carvedilol; restart metoprolol  - Continue to monitor I/Os and standing weights     5. Type 2 diabetes mellitus without complication: Chronic, patient metformin and glipizide at home. Per [facility], most recent Hgb A1C was 7.6 on 1/5/17.   - Continue to hold home medication therapy of metformin 500 BID and glipizide 5 mg BID  - Continue on SSI     6. Essential hypertension: Stable, blood pressures have been WNL.  - Continue on medications as above    7. 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 Medications as above.    8. SONJA: Chronic, stable, patient using home CPAP overnight.    9. Pulmonary hypertension: Chronic, utilize O2 PRN.    10.  Chronic pain syndrome: Stable, acetaminopen and Norco available PRN for pain.    Resolved problems:  - Sepsis  - Acute respiratory failure with hypoxia  - Acute encephalopathy  - Gram-positive bacteremia (blood cultures from 5/20/17 reveal no growth to date)  - Thrombocytopenia (platelets 153 on admission, low of 98 on 5/17/17, now normalized to 174 on 5/21/2017)    FEN: Moderate consistent CHO diet; PO fluids  DVT Prophylaxis: Warfarin, pharmacy monitoring for therapeutic dosing  Code Status: Full Code    Disposition: Expected discharge in 1-2 days     Patient was seen and examined by myself and Dr. Tejeda. The note was then scribed by me.    Jemma Garza, MS4         Interval History   Last is doing well today. He is frustrated with what to him feels like slow improvement, but he does think that he is better today. Walked down the [place] to the windows and back with the nurse. Tolerating normal diet. Afebrile overnight. Pain is well controlled. Voiding well.    -Data reviewed today: I reviewed all new labs and imaging results over the last 24 hours. I personally reviewed the chest x-ray image(s) showing no acute disease..    Physical Exam   Temp: 97.6  F (36.4  C) Temp src: Oral BP: 118/72 Pulse: 78 Heart Rate: 79 Resp: 24 SpO2: 94 % O2 Device: Nasal cannula Oxygen Delivery: 2 LPM  Vitals:    05/17/17 0615 05/19/17 0215 05/20/17 0426   Weight: (!) 150 kg (330 lb 11 oz) (!) 156.5 kg (345 lb 0.3 oz) (!) 151 kg (332 lb 14.3 oz)     Vital Signs with Ranges  Temp:  [97.6  F (36.4  C)-100.3  F (37.9  C)] 97.6  F (36.4  C)  Pulse:  [78-86] 78  Heart Rate:  [79] 79  Resp:  [20-28] 24  BP: ()/(42-88) 118/72  SpO2:  [86 %-94 %] 94 %  I/O last 3 completed shifts:  In: 800 [P.O.:800]  Out: 225 [Urine:225]    Gen: Morbidly obese but well-appearing male, non-toxic, resting in his chair, obvious cellulitis of the right lower extremity  CV: Irregularly irregular rhythm, no murmurs appreciated. LLE with 3+ pitting to the mid  shin, RLE is wrapped in an ACE wrap but is edematous to the mid thigh. Capillary refill <2 seconds in toes bilaterally.   Resp: Good air movement, slight expiratory wheeze audible in the left lower lobe, lungs are otherwise clear to auscultation, no respiratory distress  Abdomen: Extreme central obesity, nontender to palpation  Skin: Right lower extremity is erythematous to the level of the groin though erythema in the foot has cleared and toes and heel are normal skin color. Also with patches of decreased redness over the right knee. Weeping from right thigh and hip, also with new blisters on upper thigh. Warm to touch and slightly tender to palpation. LLE skin intact and normal in appearance. Bilateral arms with several areas contusions, notably at the IV site of the right arm and the left ventral forearm. Back and head are diaphoretic.  Neuro: Alert and oriented X3. Mentation intact and speech normal. Sensation intact to lower extremities bilaterally.    Medications     - MEDICATION INSTRUCTIONS -       Warfarin Therapy Reminder         sodium chloride (PF)  3 mL Intracatheter Q8H     cefTRIAXone  2 g Intravenous Q24H     polyethylene glycol  17 g Oral BID     senna-docusate  2 tablet Oral BID     furosemide  20 mg Intravenous BID     carvedilol (COREG) tablet 3.125 mg  3.125 mg Oral BID w/meals     lisinopril (PRINIVIL/ZESTRIL) tablet 10 mg  10 mg Oral Daily     aspirin chewable tablet 81 mg  81 mg Oral Daily     insulin aspart  1-7 Units Subcutaneous TID AC     insulin aspart  1-5 Units Subcutaneous At Bedtime     Data     Recent Labs  Lab 05/21/17  0545 05/20/17  0620 05/19/17  1351 05/19/17  0538 05/18/17  0400  05/16/17  0528  05/15/17  1250   WBC 11.0 12.0* 10.4  --  9.7  < > 15.1*  --  16.1*   HGB 11.9* 12.0* 13.1*  --  12.3*  < > 13.0*  --  15.4   MCV 94 94 92  --  94  < > 92  --  91    148* 127*  --  109*  < > 102*  --  153   INR 3.32* 3.00*  --  2.39* 1.80*  < > 3.44*  < >  --     141  --    --  143  < > 143  --  140   POTASSIUM 4.4 4.3  --   --  4.3  < > 4.2  --  5.0   CHLORIDE 106 105  --   --  108  < > 108  --  103   CO2 28 28  --   --  28  < > 24  --  30   BUN 14 13  --   --  17  < > 23  --  22   CR 0.73 0.87  --   --  0.89  < > 1.05  --  1.09   ANIONGAP 8 8  --   --  7  < > 11  --  7   JOSE 8.5 8.3*  --   --  8.4*  < > 7.5*  --  8.8   * 197*  --   --  185*  < > 214*  --  180*   ALBUMIN  --   --   --   --   --   --  2.7*  --  3.8   PROTTOTAL  --   --   --   --   --   --  5.6*  --  7.5   BILITOTAL  --   --   --   --   --   --  0.9  --  0.8   ALKPHOS  --   --   --   --   --   --  68  --  111   ALT  --   --   --   --   --   --  34  --  51   AST  --   --   --   --   --   --  20  --  40   < > = values in this interval not displayed.  Magnesium (5/21/2017): 2.2    Blood cultures:  5/20/17:  No growth to date  5/15/17: Beta hemolytic Strep group G    UA RESULTS:  Recent Labs   Lab Test  05/20/17   1430   COLOR  Jhoana   APPEARANCE  Clear   URINEGLC  >499*   URINEBILI  Negative   URINEKETONE  Negative   SG  1.022   UBLD  Small*   URINEPH  5.0   PROTEIN  30*   NITRITE  Negative   LEUKEST  Negative   RBCU  4*   WBCU  1     Recent Results (from the past 24 hour(s))   XR Chest Port 1 View    Narrative    PORTABLE CHEST ONE VIEW  5/20/2017 2:05 PM     HISTORY: New fever.    COMPARISON: 5/15/2017.      Impression    IMPRESSION: No acute disease. Low lung volumes.    ROBERT WHYTE MD

## 2017-05-21 NOTE — PHARMACY-ANTICOAGULATION SERVICE
Clinical Pharmacy - Warfarin Dosing Consult     Pharmacy has been consulted to manage this patient s warfarin therapy.  Indication: Atrial Fibrillation  Therapy Goal: INR 2-3  Warfarin Prior to Admission: Yes  Warfarin PTA Regimen: 5mg daily per patient  Medication Interactions: acetaminophen, aspirin, ceftriaxone, hydrocodone/acetaminophen    INR   Date Value Ref Range Status   05/21/2017 3.32 (H) 0.86 - 1.14 Final   05/20/2017 3.00 (H) 0.86 - 1.14 Final       Recommend warfarin HOLDING today, due to elevated INR despite very small dose yesterday.  Pharmacy will monitor Last Pehlps daily and order warfarin doses to achieve specified goal.      Please contact pharmacy as soon as possible if the warfarin needs to be held for a procedure or if the warfarin goals change.

## 2017-05-21 NOTE — PLAN OF CARE
Problem: Goal Outcome Summary  Goal: Goal Outcome Summary  Outcome: No Change  Vital signs stable, afebrile. Legs are unchanged in appearance. Patient refused to have his legs wrapped with ace bandages because he says they hurt. Patient also refused to get out of bed this morning to check his weight with the bedside scale. Medicated for pain X 1 during the night. Incontinent of urine once during the night. He stated he is frustrated with a lack of progress in the cellulitis and infection.

## 2017-05-21 NOTE — PLAN OF CARE
Problem: Goal Outcome Summary  Goal: Goal Outcome Summary  Outcome: No Change  Bilateral lower extremities with 3+ edema, right leg redness from foot to upper thigh. IV lasix given per MD order.   Incontinent of large amount of urine. VSS

## 2017-05-22 ENCOUNTER — HOME INFUSION (PRE-WILLOW HOME INFUSION) (OUTPATIENT)
Dept: PHARMACY | Facility: CLINIC | Age: 67
End: 2017-05-22

## 2017-05-22 ENCOUNTER — APPOINTMENT (OUTPATIENT)
Dept: GENERAL RADIOLOGY | Facility: CLINIC | Age: 67
DRG: 871 | End: 2017-05-22
Attending: PEDIATRICS
Payer: MEDICARE

## 2017-05-22 VITALS
RESPIRATION RATE: 20 BRPM | SYSTOLIC BLOOD PRESSURE: 119 MMHG | DIASTOLIC BLOOD PRESSURE: 81 MMHG | HEIGHT: 73 IN | BODY MASS INDEX: 41.75 KG/M2 | OXYGEN SATURATION: 92 % | TEMPERATURE: 96.6 F | HEART RATE: 89 BPM | WEIGHT: 315 LBS

## 2017-05-22 PROBLEM — R65.20 SEVERE SEPSIS WITH ACUTE ORGAN DYSFUNCTION DUE TO STREPTOCOCCUS SPECIES (H): Status: ACTIVE | Noted: 2017-05-15

## 2017-05-22 PROBLEM — B95.5 STREPTOCOCCAL BACTEREMIA: Status: ACTIVE | Noted: 2017-05-19

## 2017-05-22 PROBLEM — A40.9 SEVERE SEPSIS WITH ACUTE ORGAN DYSFUNCTION DUE TO STREPTOCOCCUS SPECIES (H): Status: ACTIVE | Noted: 2017-05-15

## 2017-05-22 LAB
ANION GAP SERPL CALCULATED.3IONS-SCNC: 5 MMOL/L (ref 3–14)
BASOPHILS # BLD AUTO: 0 10E9/L (ref 0–0.2)
BASOPHILS NFR BLD AUTO: 0 %
BUN SERPL-MCNC: 19 MG/DL (ref 7–30)
CALCIUM SERPL-MCNC: 8.8 MG/DL (ref 8.5–10.1)
CHLORIDE SERPL-SCNC: 104 MMOL/L (ref 94–109)
CO2 SERPL-SCNC: 31 MMOL/L (ref 20–32)
CREAT SERPL-MCNC: 0.83 MG/DL (ref 0.66–1.25)
CRP SERPL-MCNC: 178 MG/L (ref 0–8)
DIFFERENTIAL METHOD BLD: ABNORMAL
EOSINOPHIL # BLD AUTO: 0.3 10E9/L (ref 0–0.7)
EOSINOPHIL NFR BLD AUTO: 3 %
ERYTHROCYTE [DISTWIDTH] IN BLOOD BY AUTOMATED COUNT: 14.9 % (ref 10–15)
GFR SERPL CREATININE-BSD FRML MDRD: ABNORMAL ML/MIN/1.7M2
GLUCOSE BLDC GLUCOMTR-MCNC: 155 MG/DL (ref 70–99)
GLUCOSE BLDC GLUCOMTR-MCNC: 175 MG/DL (ref 70–99)
GLUCOSE BLDC GLUCOMTR-MCNC: 249 MG/DL (ref 70–99)
GLUCOSE SERPL-MCNC: 174 MG/DL (ref 70–99)
HCT VFR BLD AUTO: 42.1 % (ref 40–53)
HGB BLD-MCNC: 12.6 G/DL (ref 13.3–17.7)
INR PPP: 2.93 (ref 0.86–1.14)
LYMPHOCYTES # BLD AUTO: 1.7 10E9/L (ref 0.8–5.3)
LYMPHOCYTES NFR BLD AUTO: 15 %
MCH RBC QN AUTO: 28.9 PG (ref 26.5–33)
MCHC RBC AUTO-ENTMCNC: 29.9 G/DL (ref 31.5–36.5)
MCV RBC AUTO: 97 FL (ref 78–100)
MONOCYTES # BLD AUTO: 0.8 10E9/L (ref 0–1.3)
MONOCYTES NFR BLD AUTO: 7 %
NEUTROPHILS # BLD AUTO: 8.5 10E9/L (ref 1.6–8.3)
NEUTROPHILS NFR BLD AUTO: 75 %
NRBC # BLD AUTO: 0.2 10*3/UL
NRBC BLD AUTO-RTO: 2 /100
PLATELET # BLD AUTO: 202 10E9/L (ref 150–450)
PLATELET # BLD EST: NORMAL 10*3/UL
POTASSIUM SERPL-SCNC: 4.6 MMOL/L (ref 3.4–5.3)
RBC # BLD AUTO: 4.36 10E12/L (ref 4.4–5.9)
RBC MORPH BLD: NORMAL
SODIUM SERPL-SCNC: 140 MMOL/L (ref 133–144)
WBC # BLD AUTO: 11.3 10E9/L (ref 4–11)

## 2017-05-22 PROCEDURE — 25000128 H RX IP 250 OP 636: Performed by: FAMILY MEDICINE

## 2017-05-22 PROCEDURE — 86140 C-REACTIVE PROTEIN: CPT | Performed by: INTERNAL MEDICINE

## 2017-05-22 PROCEDURE — 27211136 ZZH TRAY POWER PICC SOLO 4FR SINGLE LUMEN

## 2017-05-22 PROCEDURE — A9270 NON-COVERED ITEM OR SERVICE: HCPCS | Mod: GY | Performed by: INTERNAL MEDICINE

## 2017-05-22 PROCEDURE — 25000132 ZZH RX MED GY IP 250 OP 250 PS 637: Mod: GY | Performed by: INTERNAL MEDICINE

## 2017-05-22 PROCEDURE — 25000132 ZZH RX MED GY IP 250 OP 250 PS 637: Mod: GY | Performed by: FAMILY MEDICINE

## 2017-05-22 PROCEDURE — A9270 NON-COVERED ITEM OR SERVICE: HCPCS | Mod: GY | Performed by: FAMILY MEDICINE

## 2017-05-22 PROCEDURE — 80048 BASIC METABOLIC PNL TOTAL CA: CPT | Performed by: INTERNAL MEDICINE

## 2017-05-22 PROCEDURE — 36415 COLL VENOUS BLD VENIPUNCTURE: CPT | Performed by: INTERNAL MEDICINE

## 2017-05-22 PROCEDURE — 40000986 XR CHEST 1 VW

## 2017-05-22 PROCEDURE — 99239 HOSP IP/OBS DSCHRG MGMT >30: CPT | Performed by: PEDIATRICS

## 2017-05-22 PROCEDURE — 85025 COMPLETE CBC W/AUTO DIFF WBC: CPT | Performed by: INTERNAL MEDICINE

## 2017-05-22 PROCEDURE — 36569 INSJ PICC 5 YR+ W/O IMAGING: CPT

## 2017-05-22 PROCEDURE — 85610 PROTHROMBIN TIME: CPT | Performed by: INTERNAL MEDICINE

## 2017-05-22 PROCEDURE — 00000146 ZZHCL STATISTIC GLUCOSE BY METER IP

## 2017-05-22 PROCEDURE — 25000125 ZZHC RX 250: Performed by: PEDIATRICS

## 2017-05-22 RX ORDER — HYDROCODONE BITARTRATE AND ACETAMINOPHEN 5; 325 MG/1; MG/1
2 TABLET ORAL EVERY 6 HOURS PRN
Qty: 40 TABLET | Refills: 0 | Status: SHIPPED | OUTPATIENT
Start: 2017-05-22

## 2017-05-22 RX ORDER — WARFARIN SODIUM 5 MG/1
TABLET ORAL
Qty: 30 TABLET | COMMUNITY
Start: 2017-05-22

## 2017-05-22 RX ORDER — LIDOCAINE 40 MG/G
CREAM TOPICAL
Status: DISCONTINUED | OUTPATIENT
Start: 2017-05-22 | End: 2017-05-22 | Stop reason: HOSPADM

## 2017-05-22 RX ORDER — FUROSEMIDE 20 MG
20 TABLET ORAL 2 TIMES DAILY
Qty: 30 TABLET | COMMUNITY
Start: 2017-05-22

## 2017-05-22 RX ORDER — CEFTRIAXONE 2 G/1
2 INJECTION, POWDER, FOR SOLUTION INTRAMUSCULAR; INTRAVENOUS EVERY 24 HOURS
Qty: 140 ML | Refills: 0 | COMMUNITY
Start: 2017-05-22

## 2017-05-22 RX ORDER — FUROSEMIDE 20 MG
20 TABLET ORAL DAILY
Status: ON HOLD | COMMUNITY
Start: 2017-01-05 | End: 2017-05-22

## 2017-05-22 RX ORDER — WARFARIN SODIUM 2.5 MG/1
2.5 TABLET ORAL
Status: DISCONTINUED | OUTPATIENT
Start: 2017-05-22 | End: 2017-05-22 | Stop reason: HOSPADM

## 2017-05-22 RX ORDER — CEFTRIAXONE 2 G/1
2 INJECTION, POWDER, FOR SOLUTION INTRAMUSCULAR; INTRAVENOUS EVERY 24 HOURS
Status: CANCELLED | OUTPATIENT
Start: 2017-05-24

## 2017-05-22 RX ORDER — HEPARIN SODIUM,PORCINE 10 UNIT/ML
2-5 VIAL (ML) INTRAVENOUS
Status: DISCONTINUED | OUTPATIENT
Start: 2017-05-22 | End: 2017-05-22 | Stop reason: HOSPADM

## 2017-05-22 RX ORDER — WARFARIN SODIUM 5 MG/1
TABLET ORAL
Status: ON HOLD | COMMUNITY
Start: 2017-03-28 | End: 2017-05-22

## 2017-05-22 RX ADMIN — SENNOSIDES AND DOCUSATE SODIUM 2 TABLET: 8.6; 5 TABLET ORAL at 09:15

## 2017-05-22 RX ADMIN — HYDROCODONE BITARTRATE AND ACETAMINOPHEN 2 TABLET: 5; 325 TABLET ORAL at 11:15

## 2017-05-22 RX ADMIN — FUROSEMIDE 20 MG: 10 INJECTION, SOLUTION INTRAVENOUS at 15:51

## 2017-05-22 RX ADMIN — LISINOPRIL 10 MG: 10 TABLET ORAL at 09:15

## 2017-05-22 RX ADMIN — HYDROCODONE BITARTRATE AND ACETAMINOPHEN 2 TABLET: 5; 325 TABLET ORAL at 15:49

## 2017-05-22 RX ADMIN — POLYETHYLENE GLYCOL 3350 17 G: 17 POWDER, FOR SOLUTION ORAL at 09:15

## 2017-05-22 RX ADMIN — METOPROLOL TARTRATE 50 MG: 50 TABLET, FILM COATED ORAL at 09:15

## 2017-05-22 RX ADMIN — FUROSEMIDE 20 MG: 10 INJECTION, SOLUTION INTRAVENOUS at 09:16

## 2017-05-22 RX ADMIN — INSULIN ASPART 1 UNITS: 100 INJECTION, SOLUTION INTRAVENOUS; SUBCUTANEOUS at 09:16

## 2017-05-22 RX ADMIN — ASPIRIN 81 MG 81 MG: 81 TABLET ORAL at 09:15

## 2017-05-22 RX ADMIN — Medication 1 ML: at 16:30

## 2017-05-22 RX ADMIN — INSULIN ASPART 3 UNITS: 100 INJECTION, SOLUTION INTRAVENOUS; SUBCUTANEOUS at 12:42

## 2017-05-22 RX ADMIN — HYDROCODONE BITARTRATE AND ACETAMINOPHEN 2 TABLET: 5; 325 TABLET ORAL at 06:03

## 2017-05-22 RX ADMIN — CEFTRIAXONE SODIUM 2 G: 2 INJECTION, POWDER, FOR SOLUTION INTRAMUSCULAR; INTRAVENOUS at 12:42

## 2017-05-22 RX ADMIN — CARVEDILOL 3.12 MG: 3.12 TABLET, FILM COATED ORAL at 09:15

## 2017-05-22 NOTE — PHARMACY-ANTICOAGULATION SERVICE
Clinical Pharmacy - Warfarin Dosing Consult     Pharmacy has been consulted to manage this patient s warfarin therapy.  Indication: Atrial Fibrillation  Therapy Goal: INR 2-3  Warfarin Prior to Admission: Yes  Warfarin PTA Regimen: 5mg daily per patient    INR   Date Value Ref Range Status   05/22/2017 2.93 (H) 0.86 - 1.14 Final   05/21/2017 3.32 (H) 0.86 - 1.14 Final       Recommend warfarin 2.5 mg today.  Pharmacy will monitor Last Phelps daily and order warfarin doses to achieve specified goal.      Please contact pharmacy as soon as possible if the warfarin needs to be held for a procedure or if the warfarin goals change.

## 2017-05-22 NOTE — PROGRESS NOTES
S-(situation): Patient discharged to home via wheelchair with spouse    B-(background): right leg cellulitis    A-(assessment): right leg red/hot/swollen, afebrile, white count normal, up with minimal assist to no assist in room, right leg ace wrapped for edema control, pain controlled with norco prn    R-(recommendations): Discharge instructions reviewed with patient and spouse. Listed belongings gathered and returned to patient.          Discharge Nursing Criteria:     Care Plan and Patient education resolved: Yes    New Medications- pt has been educated about purpose and side effects: Yes    Vaccines  Pneumonia Vaccine verified at discharge: Yes  Influenza status verified at discharge:  Yes      Intentionally Retained Items (examples: Drains, Wound packing, ureteral stents, mireles, PICC line or IV)  Patient was sent home with PICC left in place.   Information you need to know about your procedure form filled out and copy given to patient: Yes  Follow up appointment made for removal: No    MISC  Prescriptions if needed, hard copies sent with patient  NA  Home and hospital aquired medications returned to patient: NA  Medication Bin checked and emptied on discharge Yes  Patient reports post-discharge pain management plan is effective: Yes

## 2017-05-22 NOTE — PROGRESS NOTES
Therapy: ABX  Insurance: [employer] Pl[employer]  Medicare replacement of part B, no IV abx coverage. Must be homebound for nursing coverage.   In reference to admission on  5/22/17 to check IV abx coverage     Please contact Intake with any questions, [phone] or In Basket pool,  Home Infusion ([phone]).  Thank you!

## 2017-05-22 NOTE — DISCHARGE SUMMARY
[facility]    Discharge Summary  Hospitalist    Date of Admission:  5/15/2017  Date of Discharge:  5/22/2017  Discharging Provider: Bird Schmitt  Date of Service (when I saw the patient): 05/22/17    Discharge Diagnoses     Severe sepsis with acute organ dysfunction due to Streptococcus species (H)    Cellulitis of right leg    Streptococcal bacteremia    Acute respiratory failure with hypoxia (H)    Acute encephalopathy    Atrial fibrillation with RVR (H)    Type 2 diabetes mellitus without complication (H)    Cardiomyopathy, unspecified (H) - secondary to rapid atrial flutter    Chronic combined systolic and diastolic congestive heart failure (H)    Thrombocytopenia (H)    Essential hypertension    SONJA (obstructive sleep apnea)    Morbid obesity (H)    Pulmonary hypertension (H)    Chronic pain syndrome    * No resolved hospital problems. *      History of Present Illness   Last Phelps is an 66 year old male with multiple chronic medical problems including diabetes, hypertension, cardiomyopathy with combined systolic and diastolic CHF, and atrial fibrillation and atrial flutter who presented with one day history of worsening right leg pain, redness, and confusion.  Because of concern for sepsis with encephalopathy due to cellulitis, he was hospitalized. See admission history and physical for details.    Hospital Course   Last Phelps was admitted on 5/15/2017.  The following problems were addressed during his hospitalization:    Problem #1 right lower extremity cellulitis with severe sepsis and acute organ dysfunction due to group G strep.  Both initial blood cultures grew group G strep.  Urine culture was negative.  Nasal culture for MRSA was negative.  There were no infiltrates on chest x-ray although there were findings consistent with atelectasis.  Lactic acid was elevated as high as 3.3.  He was treated with parenteral antibiotics and aggressive IV fluid  resuscitation.  Initial hypotension responded to IV fluids and he did not require vasopressors.  Creatinine was initially elevated consistent with acute renal failure.  He was thrombocytopenic with INR more prolonged than usual on chronic warfarin therapy concerning for possible DIC.  He was confused but did not have focal neurologic deficits or seizures.  He improved but developed recurrent fever to 103 later in his hospital stay while receiving vancomycin and Zosyn.  Repeat blood culture at that time was negative, and there was clinical suspicion for drug related fever, so he was switched to ceftriaxone.  Fever then resolved and he continued to improve.  A PICC line was placed to facilitate completion of a course of ceftriaxone therapy for cellulitis with severe sepsis due to strep.  His clinical course including acute renal failure, acute encephalopathy, possible DIC, and transient hypotension with elevated lactic acid level was consistent with severe sepsis and acute organ dysfunction.    Problem #2 leg edema.  Leg edema worsened with IV fluid resuscitation.  Lasix was restarted at a higher dose than his previous baseline.  His legs were wrapped with Ace bandages.  Ultrasound of the right lower leg demonstrated no signs of venous thrombosis or abscess.  His chronic cardiac medications were restarted once he stabilized hemodynamically.  Worsening CHF was not suspected during this hospital stay.    Problem #3 acute respiratory failure with hypoxia due to severe sepsis and atelectasis.  Increased respiratory effort coincided with initial fever and elevated lactic acid level.  Radiographic findings were consistent with atelectasis.  He was treated with oxygen and his home CPAP machine but did not otherwise require assisted ventilation.  After investigation, acute respiratory failure with hypoxia was attributed to a combination of severe sepsis and atelectasis.    Problem #4 type 2 diabetes.  Hemoglobin A1c was 7.4.   "Glipizide was held initially because of the combination of hypoglycemia and acute renal failure.  As sepsis resolved, he was advised to restart glipizide and metformin after discharge.    Bird Schmitt MD    Significant Results and Procedures   Invasive procedures: PICC line placed on 5/22/17    Pending Results   These results will be followed up by PCP  Unresulted Labs Ordered in the Past 30 Days of this Admission     Date and Time Order Name Status Description    5/20/2017 1042 Blood culture Preliminary           Code Status   Full Code       Primary Care Physician   AVI SIDDIQUI    Physical Exam   341 lbs 11.41 oz  /81  Pulse 113  Temp 97.2  F (36.2  C) (Oral)  Resp 20  Ht 1.854 m (6' 1\")  Wt (!) 155 kg (341 lb 11.4 oz)  SpO2 96%  BMI 45.08 kg/m2    No acute distress, appears morbidly obese  Irregularly irregular heart rate and rhythm  Right thigh is diffusely erythematous with patches of clearing over the anterior right knee, right thigh is warm and indurated but nontender and there are no blisters or palpable focal fluid collections, right foot and lower leg are bandaged, no open lesions are seen in the right lower extremity    Discharge Disposition   Discharged to home  Condition at discharge: Stable    Consultations This Hospital Stay   PHARMACY TO DOSE VANCO  PHARMACY TO DOSE VANCO  PHARMACY TO DOSE WARFARIN  VASCULAR ACCESS ADULT IP CONSULT    Time Spent on this Encounter   I, Bird Schmitt, personally saw the patient today and spent greater than 30 minutes discharging this patient.    Discharge Orders     Reason for your hospital stay   Hospitalized for severe infection (sepsis due to skin infection in right leg) and improved     Follow-up and recommended labs and tests    Follow up with primary care provider, AVI SIDDIQUI, within 7 days for hospital follow- up.  The following labs/tests are recommended: WBC, CRP.     Activity   Your activity upon discharge: activity as tolerated and no " driving or operating machinery while on narcotic analgesics (hydrocodone)     Monitor and record   blood glucose according to your usual home routine     Wound care and dressings   Instructions to care for your legs at home: daily ACE bandage wraps.     IV access   **Ordering Provider MUST call/page Care Coordinator/ to discuss arranging this service**    You are going home with the following vascular access device: PICC.     Full Code     Diet   Follow this diet upon discharge: Orders Placed This Encounter     Moderate Consistent CHO Diet       Discharge Medications   Current Discharge Medication List      START taking these medications    Details   cefTRIAXone (ROCEPHIN) 2 GM vial Inject 2 g into the vein every 24 hours  Qty: 140 mL, Refills: 0    Associated Diagnoses: Cellulitis of right lower extremity; Severe sepsis with acute organ dysfunction due to Streptococcus species (H); Streptococcal bacteremia         CONTINUE these medications which have CHANGED    Details   HYDROcodone-acetaminophen (NORCO) 5-325 MG per tablet Take 2 tablets by mouth every 6 hours as needed for moderate to severe pain  Qty: 40 tablet, Refills: 0    Associated Diagnoses: Cellulitis of right lower extremity      warfarin (COUMADIN) 5 MG tablet No dose on 5/22/17, then take by mouth 2.5 mg (1/2 tablet) on Wednesdays and 5 mg (1 tablet) all other days or as directed by anticoagulation clinic RN  Qty: 30 tablet      furosemide (LASIX) 20 MG tablet Take 1 tablet (20 mg) by mouth 2 times daily  Qty: 30 tablet         CONTINUE these medications which have NOT CHANGED    Details   ASPIRIN PO Take 81 mg by mouth daily      LISINOPRIL PO Take 10 mg by mouth daily      Carvedilol (COREG PO) Take 3.125 mg by mouth 2 times daily (with meals)      GLIPIZIDE PO Take 5 mg by mouth 2 times daily (before meals)      Lovastatin (MEVACOR PO) Take 20 mg by mouth At Bedtime      Metoprolol Tartrate (LOPRESSOR PO) Take 50 mg by mouth 2 times  daily      MetFORMIN HCl (GLUCOPHAGE PO) Take 500 mg by mouth 2 times daily (with meals)         STOP taking these medications       PREDNISONE PO Comments:   Reason for Stopping:             Allergies   Allergies   Allergen Reactions     No Known Allergies      Data   Most Recent 3 CBC's:  Recent Labs   Lab Test  05/22/17   0526 05/21/17   0545  05/20/17   0620   WBC  11.3*  11.0  12.0*   HGB  12.6*  11.9*  12.0*   MCV  97  94  94   PLT  202  174  148*      Most Recent 3 BMP's:  Recent Labs   Lab Test  05/22/17   0526  05/21/17   0545  05/20/17   0620   NA  140  142  141   POTASSIUM  4.6  4.4  4.3   CHLORIDE  104  106  105   CO2  31  28  28   BUN  19  14  13   CR  0.83  0.73  0.87   ANIONGAP  5  8  8   JOSE  8.8  8.5  8.3*   GLC  174*  185*  197*     Most Recent 2 LFT's:  Recent Labs   Lab Test  05/16/17   0528  05/15/17   1250   AST  20  40   ALT  34  51   ALKPHOS  68  111   BILITOTAL  0.9  0.8     Most Recent INR's and Anticoagulation Dosing History:  Anticoagulation Dose History     Recent Dosing and Labs Latest Ref Rng & Units 5/16/2017 5/17/2017 5/18/2017 5/19/2017 5/20/2017 5/21/2017 5/22/2017    Warfarin 0.5 mg - - - - - 0.5 mg - -    Warfarin 2.5 mg - - - - 2.5 mg - - -    Warfarin 5 mg - - 5 mg 5 mg - - - -    INR 0.86 - 1.14 3.44(H) 2.30(H) 1.80(H) 2.39(H) 3.00(H) 3.32(H) 2.93(H)        Most Recent 6 Bacteria Isolates From Any Culture (See EPIC Reports for Culture Details):  Recent Labs   Lab Test  05/20/17   1215  05/15/17   2010  05/15/17   1344  05/15/17   1335  05/15/17   1315   CULT  No growth after 2 days  No MRSA isolated  Cultured on the 1st day of incubation: Beta hemolytic Streptococcus group G  Critical Value/Significant Value, preliminary result only, called to and read   back by RUDY VACA IN ICU AT 0210 SC  SENT TO THE U FOR VERIGENE TESTING CR  (Note)  POSITIVE for STREPTOCOCCUS SPECIES OTHER THAN pneumococcus, anginosus  group, S. pyogenes and S. agalactiae. Performed using  Ravenflowigene  multiplex nucleic acid test. Final identification and antimicrobial  susceptibility testing will be verified by standard methods.    Specimen tested with Verigene multiplex, gram-positive blood culture  nucleic acid test for the following targets: Staph aureus, Staph  epidermidis, Staph lugdunensis, other Staph species, Enterococcus  faecalis, Enterococcus faecium, Streptococcus species, S. agalactiae,  S. anginosus grp., S. pneumoniae, S. pyogenes, Listeria sp., mecA  (methicillin resistance) and Elio/B (vancomycin resistance).    Critical Value/Significant Value called to and read back by Anita Saez RN [facility] @ 1753 5/16/17 CS    *  No growth  Cultured on the 1st day of incubation: Beta hemolytic Streptococcus, not group A  Critical Value/Significant Value, preliminary result only, called to and read   back by RUDY VACA IN ICU AT 0110 SC  See Accesion number [id number] for Id and sens testing from the U CR  *     Most Recent TSH, T4 and A1c Labs:  Recent Labs   Lab Test  05/16/17   0528   A1C  7.4*     Results for orders placed or performed during the hospital encounter of 05/15/17   XR Chest Port 1 View    Narrative    CHEST ONE VIEW PORTABLE  5/15/2017 1:39 PM     HISTORY: Hypoxia, decreased level of consciousness.    COMPARISON: None.    FINDINGS:  Lungs are hypoaerated. Increased interstitial markings  likely represent vascular crowding and/or atelectasis. Vascular  congestion is considered less likely. Tubular structure projected over  the left upper chest likely represents the breathing apparatus. No  pneumothorax or significant pleural fluid collections are identified.  No fracture is seen.      Impression    IMPRESSION:  1. Hypoaeration and pulmonary vascular crowding.  2. No other evidence of acute cardiopulmonary disease is seen.    FAYE BOWERS MD   US Lower Extremity Venous Duplex Right    Narrative    ULTRASOUND LOWER EXTREMITY VENOUS DUPLEX RIGHT   5/18/2017 3:21 PM      HISTORY: Leg swelling, redness. Evaluate for abscess or deep venous  thrombosis. Patient has right leg cellulitis.    COMPARISON: None.    FINDINGS: Gray-scale, color and Doppler spectral analysis ultrasound  was performed of the right lower extremity. Compression and  augmentation imaging was performed.    The deep venous system of the right lower extremity is fully  compressible. Spectral Doppler demonstrates normal phasicity and  excellent augmentation with calf compression.  Visualized greater  saphenous and deep calf veins are patent.    Targeted ultrasound of the area of symptoms in the right calf, where  it is red and swollen, demonstrates subcutaneous edema but no other  sonographic abnormality.      Impression    IMPRESSION:   1. No evidence for DVT within the right lower extremity.  2. Subcutaneous edema is seen in the tissues of the right medial calf  area of edema and redness. Consistent with patient's known cellulitis.  No abscess.    FAYE BOWERS MD   XR Chest Port 1 View    Narrative    PORTABLE CHEST ONE VIEW  5/20/2017 2:05 PM     HISTORY: New fever.    COMPARISON: 5/15/2017.      Impression    IMPRESSION: No acute disease. Low lung volumes.    ROBERT WHYTE MD

## 2017-05-22 NOTE — PLAN OF CARE
Problem: Goal Outcome Summary  Goal: Goal Outcome Summary  Outcome: Therapy, progress towards functional goals is fair  Right leg red and swollen (3+) with ace wrap from toes to above knee. Weeping from blisters on upper thigh is serous colored. Pain is well-controlled with Norco. Poor appetite. Voiding in 150ml amounts since IV Lasix. Walked in [place] to window with walker and SBA. Blood sugar was 262. Afebrile. Hoping to go home tomorrow. Will continue to monitor right leg's color, swelling, drainage, pain, I&O, labs. Will encourage ambulation.  Bed alarm for safety.

## 2017-05-22 NOTE — PROGRESS NOTES
[facility] PICC  Procedure Note           Peripherally Inserted Central Line Catheter (PICC):       Last Phelps  [id number]   May 22, 2017 Indication: IV abx x 7days           Pause for the cause: Consent for catheter placement procedure signed  Time out completed  Patient ID's verified using two distinct indicators  All necessary equipment is present  Site marked if extremity to be used has been predetermined   Type of line to be used: PICC   Full barrier precautions used: Yes   Skin preparation: Chloraprep   Date of insertion: 5/22/2017   Device type: Single lumen, valved, 4.0   Catheter brand: [employer]   Lot number: [id number]   Insertion location: Right brachial vein (medial)   Method of placement: Venipuncture  MST  Ultrasound   Number of attempts: With ultrasound: 1   Without ultrasound: 0   Difficulty threading: No   Midline IV device: Dressing dry and intact  Transparent semmipermeable dressing applied  Chlorhexidine patch  Catheter securement device   Arm circumference: Adults 10 cm   Midline extremity circumference: 39 cm   Internal length: 45 cm   Midline visible catheter length: 2 cm   Total catheter length: 47 cm   Tip termination: SVC/RA junction   Method of verification: Chest x-ray   Midline patency post placement: Positive blood return  Flushes without difficulty  Saline locked   Line flush: Line flush documented on the eMAR yes   Placement verified by: Physician   Catheter placed by: Millicent Quan   Discontinuation form initiated: No   Patient tolerance: Tolerated well  Intradermal injection   PICC Educational information to patient (Information from PICC package):  Yes   VAD flushing orders entered:  Yes     Summary:  This procedure was performed without difficulty. There were no immediate complications.

## 2017-05-22 NOTE — PLAN OF CARE
Problem: Goal Outcome Summary  Goal: Goal Outcome Summary  S-(situation): end of shift note     B-(background): sepsis, cellulitis RLE     A-(assessment): VSS, afebrile.  RLE reddened, hot to touch with 3+ pitting edema.  Some weeping from blisters on upper right thigh.  Lung sounds diminished/clear, on home CPAP during the night with 2L O2.  Early morning blood glucose 155.       R-(recommendations): Continue to monitor for pain, encourage ambulation.

## 2017-05-22 NOTE — PROGRESS NOTES
Name: Last Phelps    MRN#: [id number]    Reason for Hospitalization: Bacterial sepsis (H) [A41.9]  Atrial fibrillation with rapid ventricular response (H) [I48.91]  Cellulitis of right lower extremity [L03.115]  Acute on chronic congestive heart failure, unspecified congestive heart failure type (H) [I50.9]  Morbid obesity, unspecified obesity type (H) [E66.01]    Discharge Date: 5/22/2017    Patient / Family response to discharge plan: in agreement    Follow-Up Appt: No future appointments.    Other Providers (Care Coordinator, County Services, PCA services etc): No    Discharge Disposition: home with outpatient infusion therapy - patient does not have insurance coverage for home IV therapy.    CYRUS Buckley  [facility] [phone]/ [facility] [phone]

## 2017-05-23 ENCOUNTER — INFUSION THERAPY VISIT (OUTPATIENT)
Dept: INFUSION THERAPY | Facility: CLINIC | Age: 67
DRG: 871 | End: 2017-05-23
Attending: PEDIATRICS
Payer: MEDICARE

## 2017-05-23 VITALS
TEMPERATURE: 97.8 F | SYSTOLIC BLOOD PRESSURE: 128 MMHG | RESPIRATION RATE: 24 BRPM | DIASTOLIC BLOOD PRESSURE: 92 MMHG | HEART RATE: 62 BPM

## 2017-05-23 DIAGNOSIS — R65.20 SEVERE SEPSIS WITH ACUTE ORGAN DYSFUNCTION DUE TO STREPTOCOCCUS SPECIES (H): ICD-10-CM

## 2017-05-23 DIAGNOSIS — R78.81 STREPTOCOCCAL BACTEREMIA: Primary | ICD-10-CM

## 2017-05-23 DIAGNOSIS — B95.5 STREPTOCOCCAL BACTEREMIA: Primary | ICD-10-CM

## 2017-05-23 DIAGNOSIS — A40.9 SEVERE SEPSIS WITH ACUTE ORGAN DYSFUNCTION DUE TO STREPTOCOCCUS SPECIES (H): ICD-10-CM

## 2017-05-23 DIAGNOSIS — L03.115 CELLULITIS OF RIGHT LEG: ICD-10-CM

## 2017-05-23 PROCEDURE — 96365 THER/PROPH/DIAG IV INF INIT: CPT

## 2017-05-23 PROCEDURE — 25000128 H RX IP 250 OP 636: Performed by: PEDIATRICS

## 2017-05-23 RX ORDER — CEFTRIAXONE 2 G/1
2 INJECTION, POWDER, FOR SOLUTION INTRAMUSCULAR; INTRAVENOUS EVERY 24 HOURS
Status: DISCONTINUED | OUTPATIENT
Start: 2017-05-24 | End: 2017-05-23 | Stop reason: HOSPADM

## 2017-05-23 RX ORDER — CEFTRIAXONE 2 G/1
2 INJECTION, POWDER, FOR SOLUTION INTRAMUSCULAR; INTRAVENOUS EVERY 24 HOURS
Status: CANCELLED | OUTPATIENT
Start: 2017-05-24

## 2017-05-23 RX ADMIN — CEFTRIAXONE SODIUM 2 G: 2 INJECTION, POWDER, FOR SOLUTION INTRAMUSCULAR; INTRAVENOUS at 09:33

## 2017-05-23 RX ADMIN — SODIUM CHLORIDE 250 ML: 9 INJECTION, SOLUTION INTRAVENOUS at 09:31

## 2017-05-23 ASSESSMENT — PAIN SCALES - GENERAL: PAINLEVEL: MODERATE PAIN (4)

## 2017-05-23 NOTE — PATIENT INSTRUCTIONS
Pt to return on 5/24/17  for Rocephin. Copies of medication list and upcoming appointments given prior to discharge.

## 2017-05-23 NOTE — PROGRESS NOTES
Patient here for Rocephin infusion. Tolerated well. Positive blood return in PICC line pre/post infusion. Discharged in stable condition. Return tomorrow.

## 2017-05-23 NOTE — MR AVS SNAPSHOT
After Visit Summary   5/23/2017    Last Phelps    MRN: [id number]           Patient Information     Date Of Birth          1950        Visit Information        Provider Department      5/23/2017 9:30 AM NL INFUSION CHAIR 2 [facility]        Today's Diagnoses     Streptococcal bacteremia    -  1    Severe sepsis with acute organ dysfunction due to Streptococcus species (H)        Cellulitis of right leg          Care Instructions    Pt to return on 5/24/17  for Rocephin. Copies of medication list and upcoming appointments given prior to discharge.         Follow-ups after your visit        Your next 10 appointments already scheduled     May 24, 2017 10:00 AM CDT   Level 1 with NL INFUSION CHAIR 4   [facility] ([facility])    [address] [place] [address] Dr Mikal FOLEY [id number] [phone]            May 25, 2017  8:30 AM CDT   Level 1 with NL INFUSION CHAIR 5   [facility] ([facility])    [address] Mikal FOLEY [address] [phone]            May 26, 2017  9:00 AM CDT   Level 1 with NL INFUSION CHAIR 2   [facility] ([facility])    [address] [place] [address] Dr Mikal FOLEY [address]   [phone]              Who to contact     If you have questions or need follow up information about today's clinic visit or your schedule please contact [facility] directly at [phone].  Normal or non-critical lab and imaging results will be communicated to you by MyChart, letter or phone within 4 business days after the clinic has received the results. If you do not hear from us within 7 days, please contact the clinic through MyChart or phone. If you have a critical or abnormal lab result, we will notify you by phone as soon as possible.  Submit refill requests through [employer] or call your pharmacy and they will forward the refill  "request to us. Please allow 3 business days for your refill to be completed.          Additional Information About Your Visit        MyChart Information     [facility] lets you send messages to your doctor, view your test results, renew your prescriptions, schedule appointments and more. To sign up, go to [email or web address] . Click on \"Log in\" on the left side of the screen, which will take you to the Welcome page. Then click on \"Sign up Now\" on the right side of the page.     You will be asked to enter the access code listed below, as well as some personal information. Please follow the directions to create your username and password.     Your access code is: [email or web address]  Expires: 2017 11:35 AM     Your access code will  in 90 days. If you need help or a new code, please call your [facility] clinic or [phone].        Care EveryWhere ID     This is your Care EveryWhere ID. This could be used by other organizations to access your [facility] medical records  [id number]        Your Vitals Were     Pulse Temperature Respirations             62 97.8  F (36.6  C) (Temporal) 24          Blood Pressure from Last 3 Encounters:   17 (!) 128/92   17 119/81    Weight from Last 3 Encounters:   17 (!) 155 kg (341 lb 11.4 oz)              Today, you had the following     No orders found for display       Primary Care Provider Office Phone # Fax #    [facility] [phone]       [facility] [address] [address]        Thank you!     Thank you for choosing [facility]  for your care. Our goal is always to provide you with excellent care. Hearing back from our patients is one way we can continue to improve our services. Please take a few minutes to complete the written survey that you may receive in the mail after your visit with us. Thank you!             Your Updated Medication List - Protect others around you: Learn how to safely " use, store and throw away your medicines at www.disposemymeds.org.          This list is accurate as of: 5/23/17  2:52 PM.  Always use your most recent med list.                   Brand Name Dispense Instructions for use    ASPIRIN PO      Take 81 mg by mouth daily       cefTRIAXone 2 GM vial    ROCEPHIN    140 mL    Inject 2 g into the vein every 24 hours       COREG PO      Take 3.125 mg by mouth 2 times daily (with meals)       furosemide 20 MG tablet    LASIX    30 tablet    Take 1 tablet (20 mg) by mouth 2 times daily       GLIPIZIDE PO      Take 5 mg by mouth 2 times daily (before meals)       GLUCOPHAGE PO      Take 500 mg by mouth 2 times daily (with meals)       HYDROcodone-acetaminophen 5-325 MG per tablet    NORCO    40 tablet    Take 2 tablets by mouth every 6 hours as needed for moderate to severe pain       LISINOPRIL PO      Take 10 mg by mouth daily       LOPRESSOR PO      Take 50 mg by mouth 2 times daily       MEVACOR PO      Take 20 mg by mouth At Bedtime       warfarin 5 MG tablet    COUMADIN    30 tablet    No dose on 5/22/17, then take by mouth 2.5 mg (1/2 tablet) on Wednesdays and 5 mg (1 tablet) all other days or as directed by anticoagulation clinic RN

## 2017-05-24 ENCOUNTER — INFUSION THERAPY VISIT (OUTPATIENT)
Dept: INFUSION THERAPY | Facility: CLINIC | Age: 67
End: 2017-05-24
Attending: PEDIATRICS
Payer: MEDICARE

## 2017-05-24 VITALS
HEART RATE: 94 BPM | RESPIRATION RATE: 24 BRPM | DIASTOLIC BLOOD PRESSURE: 97 MMHG | TEMPERATURE: 97.6 F | OXYGEN SATURATION: 95 % | SYSTOLIC BLOOD PRESSURE: 168 MMHG

## 2017-05-24 DIAGNOSIS — B95.5 STREPTOCOCCAL BACTEREMIA: Primary | ICD-10-CM

## 2017-05-24 DIAGNOSIS — R65.20 SEVERE SEPSIS WITH ACUTE ORGAN DYSFUNCTION DUE TO STREPTOCOCCUS SPECIES (H): ICD-10-CM

## 2017-05-24 DIAGNOSIS — A40.9 SEVERE SEPSIS WITH ACUTE ORGAN DYSFUNCTION DUE TO STREPTOCOCCUS SPECIES (H): ICD-10-CM

## 2017-05-24 DIAGNOSIS — R78.81 STREPTOCOCCAL BACTEREMIA: Primary | ICD-10-CM

## 2017-05-24 DIAGNOSIS — L03.115 CELLULITIS OF RIGHT LEG: ICD-10-CM

## 2017-05-24 PROCEDURE — 25000128 H RX IP 250 OP 636: Performed by: PEDIATRICS

## 2017-05-24 PROCEDURE — 96365 THER/PROPH/DIAG IV INF INIT: CPT

## 2017-05-24 RX ORDER — CEFTRIAXONE 2 G/1
2 INJECTION, POWDER, FOR SOLUTION INTRAMUSCULAR; INTRAVENOUS EVERY 24 HOURS
Status: DISCONTINUED | OUTPATIENT
Start: 2017-05-25 | End: 2017-05-25 | Stop reason: HOSPADM

## 2017-05-24 RX ORDER — CEFTRIAXONE 2 G/1
2 INJECTION, POWDER, FOR SOLUTION INTRAMUSCULAR; INTRAVENOUS EVERY 24 HOURS
Status: CANCELLED | OUTPATIENT
Start: 2017-05-25

## 2017-05-24 RX ADMIN — CEFTRIAXONE SODIUM 2 G: 2 INJECTION, POWDER, FOR SOLUTION INTRAMUSCULAR; INTRAVENOUS at 10:18

## 2017-05-24 ASSESSMENT — PAIN SCALES - GENERAL: PAINLEVEL: SEVERE PAIN (6)

## 2017-05-24 NOTE — MR AVS SNAPSHOT
After Visit Summary   5/24/2017    Last Phelps    MRN: [id number]           Patient Information     Date Of Birth          1950        Visit Information        Provider Department      5/24/2017 10:00 AM NL INFUSION CHAIR 4 [facility]        Today's Diagnoses     Streptococcal bacteremia    -  1    Severe sepsis with acute organ dysfunction due to Streptococcus species (H)        Cellulitis of right leg          Care Instructions    Pt to return on 5/25 for Rocephin.          Follow-ups after your visit        Follow-up notes from your care team     Return in 1 day (on 5/25/2017).      Your next 10 appointments already scheduled     May 25, 2017  8:30 AM CDT   Level 1 with NL INFUSION CHAIR 5   [facility] ([facility])    [address] [place] [id number] [phone]            May 26, 2017  9:00 AM CDT   Level 1 with NL INFUSION CHAIR 2   [facility] ([facility])    [address] [place] [id number] [phone]              Who to contact     If you have questions or need follow up information about today's clinic visit or your schedule please contact [facility] INFUSION SERVICES directly at [phone].  Normal or non-critical lab and imaging results will be communicated to you by StyleJamhart, letter or phone within 4 business days after the clinic has received the results. If you do not hear from us within 7 days, please contact the clinic through StyleJamhart or phone. If you have a critical or abnormal lab result, we will notify you by phone as soon as possible.  Submit refill requests through CouchOne or call your pharmacy and they will forward the refill request to us. Please allow 3 business days for your refill to be completed.          Additional Information About Your Visit        StyleJamharTEXbase Information     CouchOne lets you send messages to your doctor,  "view your test results, renew your prescriptions, schedule appointments and more. To sign up, go to [email or web address] . Click on \"Log in\" on the left side of the screen, which will take you to the Welcome page. Then click on \"Sign up Now\" on the right side of the page.     You will be asked to enter the access code listed below, as well as some personal information. Please follow the directions to create your username and password.     Your access code is: [email or web address]  Expires: 2017 11:35 AM     Your access code will  in 90 days. If you need help or a new code, please call your [place] clinic or [phone].        Care EveryWhere ID     This is your Care EveryWhere ID. This could be used by other organizations to access your [place] medical records  [id number]        Your Vitals Were     Pulse Temperature Respirations Pulse Oximetry          94 97.6  F (36.4  C) (Temporal) 24 95%         Blood Pressure from Last 3 Encounters:   17 (!) 168/97   17 (!) 128/92   17 119/81    Weight from Last 3 Encounters:   17 (!) 155 kg (341 lb 11.4 oz)              Today, you had the following     No orders found for display       Primary Care Provider Office Phone # Fax #    Christofer Buckley [phone]       [facility] [address] [address]        Thank you!     Thank you for choosing [facility] INFUSION SERVICES  for your care. Our goal is always to provide you with excellent care. Hearing back from our patients is one way we can continue to improve our services. Please take a few minutes to complete the written survey that you may receive in the mail after your visit with us. Thank you!             Your Updated Medication List - Protect others around you: Learn how to safely use, store and throw away your medicines at www.disposemymeds.org.          This list is accurate as of: 17 11:59 PM.  Always use your most recent med list.       "             Brand Name Dispense Instructions for use    ASPIRIN PO      Take 81 mg by mouth daily       cefTRIAXone 2 GM vial    ROCEPHIN    140 mL    Inject 2 g into the vein every 24 hours       COREG PO      Take 3.125 mg by mouth 2 times daily (with meals)       furosemide 20 MG tablet    LASIX    30 tablet    Take 1 tablet (20 mg) by mouth 2 times daily       GLIPIZIDE PO      Take 5 mg by mouth 2 times daily (before meals)       GLUCOPHAGE PO      Take 500 mg by mouth 2 times daily (with meals)       HYDROcodone-acetaminophen 5-325 MG per tablet    NORCO    40 tablet    Take 2 tablets by mouth every 6 hours as needed for moderate to severe pain       LISINOPRIL PO      Take 10 mg by mouth daily       LOPRESSOR PO      Take 50 mg by mouth 2 times daily       MEVACOR PO      Take 20 mg by mouth At Bedtime       warfarin 5 MG tablet    COUMADIN    30 tablet    No dose on 5/22/17, then take by mouth 2.5 mg (1/2 tablet) on Wednesdays and 5 mg (1 tablet) all other days or as directed by anticoagulation clinic RN

## 2017-05-25 ENCOUNTER — INFUSION THERAPY VISIT (OUTPATIENT)
Dept: INFUSION THERAPY | Facility: CLINIC | Age: 67
End: 2017-05-25
Attending: PEDIATRICS
Payer: MEDICARE

## 2017-05-25 VITALS
RESPIRATION RATE: 28 BRPM | TEMPERATURE: 96.4 F | SYSTOLIC BLOOD PRESSURE: 143 MMHG | HEART RATE: 75 BPM | DIASTOLIC BLOOD PRESSURE: 86 MMHG | OXYGEN SATURATION: 91 %

## 2017-05-25 DIAGNOSIS — R65.20 SEVERE SEPSIS WITH ACUTE ORGAN DYSFUNCTION DUE TO STREPTOCOCCUS SPECIES (H): ICD-10-CM

## 2017-05-25 DIAGNOSIS — R78.81 STREPTOCOCCAL BACTEREMIA: Primary | ICD-10-CM

## 2017-05-25 DIAGNOSIS — A40.9 SEVERE SEPSIS WITH ACUTE ORGAN DYSFUNCTION DUE TO STREPTOCOCCUS SPECIES (H): ICD-10-CM

## 2017-05-25 DIAGNOSIS — B95.5 STREPTOCOCCAL BACTEREMIA: Primary | ICD-10-CM

## 2017-05-25 DIAGNOSIS — L03.115 CELLULITIS OF RIGHT LEG: ICD-10-CM

## 2017-05-25 PROCEDURE — 25000128 H RX IP 250 OP 636: Performed by: PEDIATRICS

## 2017-05-25 PROCEDURE — 96365 THER/PROPH/DIAG IV INF INIT: CPT

## 2017-05-25 RX ORDER — CEFTRIAXONE 2 G/1
2 INJECTION, POWDER, FOR SOLUTION INTRAMUSCULAR; INTRAVENOUS EVERY 24 HOURS
Status: DISCONTINUED | OUTPATIENT
Start: 2017-05-25 | End: 2017-05-25 | Stop reason: HOSPADM

## 2017-05-25 RX ORDER — CEFTRIAXONE 2 G/1
2 INJECTION, POWDER, FOR SOLUTION INTRAMUSCULAR; INTRAVENOUS EVERY 24 HOURS
Status: CANCELLED | OUTPATIENT
Start: 2017-05-26

## 2017-05-25 RX ADMIN — SODIUM CHLORIDE 250 ML: 9 INJECTION, SOLUTION INTRAVENOUS at 08:45

## 2017-05-25 RX ADMIN — CEFTRIAXONE SODIUM 2 G: 2 INJECTION, POWDER, FOR SOLUTION INTRAMUSCULAR; INTRAVENOUS at 08:47

## 2017-05-25 ASSESSMENT — PAIN SCALES - GENERAL: PAINLEVEL: MODERATE PAIN (4)

## 2017-05-25 NOTE — PATIENT INSTRUCTIONS
Pt to return on 5/26/17  for rocephin. Copies of medication list and upcoming appointments given prior to discharge.

## 2017-05-25 NOTE — MR AVS SNAPSHOT
"              After Visit Summary   5/25/2017    Last Phelps    MRN: [id number]           Patient Information     Date Of Birth          1950        Visit Information        Provider Department      5/25/2017 8:30 AM NL INFUSION CHAIR 5 [facility]fusion [facility]        Today's Diagnoses     Streptococcal bacteremia    -  1    Severe sepsis with acute organ dysfunction due to Streptococcus species (H)        Cellulitis of right leg          Care Instructions    Pt to return on 5/26/17  for rocephin. Copies of medication list and upcoming appointments given prior to discharge.             Follow-ups after your visit        Your next 10 appointments already scheduled     May 26, 2017  9:00 AM CDT   Level 1 with NL INFUSION CHAIR 2   [facility] Infusion [facility] ([facility])    [address] [place] [address] [phone]              Who to contact     If you have questions or need follow up information about today's clinic visit or your schedule please contact [facility] INFUSION [facility] directly at [phone].  Normal or non-critical lab and imaging results will be communicated to you by Eachpalhart, letter or phone within 4 business days after the clinic has received the results. If you do not hear from us within 7 days, please contact the clinic through Measyt or phone. If you have a critical or abnormal lab result, we will notify you by phone as soon as possible.  Submit refill requests through Launchpad Toys or call your pharmacy and they will forward the refill request to us. Please allow 3 business days for your refill to be completed.          Additional Information About Your Visit        Eachpalhart Information     Launchpad Toys lets you send messages to your doctor, view your test results, renew your prescriptions, schedule appointments and more. To sign up, go to [email or web address] . Click on \"Log in\" on the left side of the screen, which will take " "you to the Welcome page. Then click on \"Sign up Now\" on the right side of the page.     You will be asked to enter the access code listed below, as well as some personal information. Please follow the directions to create your username and password.     Your access code is: [email or web address]  Expires: 2017 11:35 AM     Your access code will  in 90 days. If you need help or a new code, please call your [place] clinic or [phone].        Care EveryWhere ID     This is your Care EveryWhere ID. This could be used by other organizations to access your [place] medical records  [id number]        Your Vitals Were     Pulse Temperature Respirations Pulse Oximetry          75 96.4  F (35.8  C) (Temporal) 28 91%         Blood Pressure from Last 3 Encounters:   17 143/86   17 (!) 168/97   17 (!) 128/92    Weight from Last 3 Encounters:   17 (!) 155 kg (341 lb 11.4 oz)              Today, you had the following     No orders found for display       Primary Care Provider Office Phone # Fax #    Christofer Buckley [phone]       [facility] [address] [address]        Thank you!     Thank you for choosing [facility] INFUSION SERVICES  for your care. Our goal is always to provide you with excellent care. Hearing back from our patients is one way we can continue to improve our services. Please take a few minutes to complete the written survey that you may receive in the mail after your visit with us. Thank you!             Your Updated Medication List - Protect others around you: Learn how to safely use, store and throw away your medicines at www.disposemymeds.org.          This list is accurate as of: 17  1:55 PM.  Always use your most recent med list.                   Brand Name Dispense Instructions for use    ASPIRIN PO      Take 81 mg by mouth daily       cefTRIAXone 2 GM vial    ROCEPHIN    140 mL    Inject 2 g into the vein every 24 hours    "    COREG PO      Take 3.125 mg by mouth 2 times daily (with meals)       furosemide 20 MG tablet    LASIX    30 tablet    Take 1 tablet (20 mg) by mouth 2 times daily       GLIPIZIDE PO      Take 5 mg by mouth 2 times daily (before meals)       GLUCOPHAGE PO      Take 500 mg by mouth 2 times daily (with meals)       HYDROcodone-acetaminophen 5-325 MG per tablet    NORCO    40 tablet    Take 2 tablets by mouth every 6 hours as needed for moderate to severe pain       LISINOPRIL PO      Take 10 mg by mouth daily       LOPRESSOR PO      Take 50 mg by mouth 2 times daily       MEVACOR PO      Take 20 mg by mouth At Bedtime       warfarin 5 MG tablet    COUMADIN    30 tablet    No dose on 5/22/17, then take by mouth 2.5 mg (1/2 tablet) on Wednesdays and 5 mg (1 tablet) all other days or as directed by anticoagulation clinic RN

## 2017-05-25 NOTE — PROGRESS NOTES
Patient here for Rocephin infusion and tolerated well. Positive blood return pre/post in PICC line. Discharged in stable condition. Return tomorrow.

## 2017-05-26 ENCOUNTER — INFUSION THERAPY VISIT (OUTPATIENT)
Dept: INFUSION THERAPY | Facility: CLINIC | Age: 67
End: 2017-05-26
Attending: PEDIATRICS
Payer: MEDICARE

## 2017-05-26 VITALS
TEMPERATURE: 96.7 F | SYSTOLIC BLOOD PRESSURE: 114 MMHG | HEART RATE: 73 BPM | RESPIRATION RATE: 20 BRPM | DIASTOLIC BLOOD PRESSURE: 61 MMHG

## 2017-05-26 DIAGNOSIS — R65.20 SEVERE SEPSIS WITH ACUTE ORGAN DYSFUNCTION DUE TO STREPTOCOCCUS SPECIES (H): ICD-10-CM

## 2017-05-26 DIAGNOSIS — L03.115 CELLULITIS OF RIGHT LEG: ICD-10-CM

## 2017-05-26 DIAGNOSIS — A40.9 SEVERE SEPSIS WITH ACUTE ORGAN DYSFUNCTION DUE TO STREPTOCOCCUS SPECIES (H): ICD-10-CM

## 2017-05-26 DIAGNOSIS — B95.5 STREPTOCOCCAL BACTEREMIA: Primary | ICD-10-CM

## 2017-05-26 DIAGNOSIS — R78.81 STREPTOCOCCAL BACTEREMIA: Primary | ICD-10-CM

## 2017-05-26 LAB
BACTERIA SPEC CULT: NORMAL
Lab: NORMAL
MICRO REPORT STATUS: NORMAL
SPECIMEN SOURCE: NORMAL

## 2017-05-26 PROCEDURE — 96365 THER/PROPH/DIAG IV INF INIT: CPT

## 2017-05-26 PROCEDURE — 25000128 H RX IP 250 OP 636: Performed by: PEDIATRICS

## 2017-05-26 RX ORDER — CEFTRIAXONE 2 G/1
2 INJECTION, POWDER, FOR SOLUTION INTRAMUSCULAR; INTRAVENOUS EVERY 24 HOURS
Status: DISCONTINUED | OUTPATIENT
Start: 2017-05-27 | End: 2017-05-26 | Stop reason: HOSPADM

## 2017-05-26 RX ORDER — CEFTRIAXONE 2 G/1
2 INJECTION, POWDER, FOR SOLUTION INTRAMUSCULAR; INTRAVENOUS EVERY 24 HOURS
Status: CANCELLED | OUTPATIENT
Start: 2017-05-27

## 2017-05-26 RX ADMIN — CEFTRIAXONE SODIUM 2 G: 2 INJECTION, POWDER, FOR SOLUTION INTRAMUSCULAR; INTRAVENOUS at 09:09

## 2017-05-26 RX ADMIN — SODIUM CHLORIDE 250 ML: 9 INJECTION, SOLUTION INTRAVENOUS at 09:09

## 2017-05-26 ASSESSMENT — PAIN SCALES - GENERAL: PAINLEVEL: MODERATE PAIN (5)

## 2017-05-26 NOTE — PATIENT INSTRUCTIONS
Pt to return on 5/27/17  for Rocephin via PICC. Copies of medication list and upcoming appointments given prior to discharge.

## 2017-05-26 NOTE — PROGRESS NOTES
Patient here for IV rocephin for RLE cellulitis tolerated well. RLE very red swollen with some open blisters no drainage at this time going to see the Provider at 1100. Discharged home with wife in stable condition.

## 2017-05-26 NOTE — PROGRESS NOTES
"Last UGY Phelps  Gender: male  : 1950  [address]  [place] [address]  [phone] (home)     Medical Record: [id number]  Pharmacy: Data Unavailable  Primary Care Provider: Christofer Buckley    Parent's names are: Data Unavailable (mother) and Data Unavailable (father).      [facility]  May 26, 2017     Discharge Phone Call:  Key Words/Key Times      Introduction - AIDET (Acknowledge, Introduce, Duration, Explanation)      Empathy-   We are calling to see how you are since your recent stay in the hospital?     Call back COMMENTS: Slowly things are getting better.        Clinical Questions -  (f/u appts, medication side effects/purpose, ability to care for self at home) \"For your safety, it is important to us that you understand the purpose and side effects of your medications, can you tell me what your new medications are?\"     Call back COMMENTS: Medications were discussed and follow-up appointment was attended today.      Staff Recognition -  We like to recognize staff and physicians who have done an excellent job.  Do you remember any people from your care team that you would like recognize?     Call back COMMENTS: Everyone was very good; patient would like to recognize Komal for her outstanding care.        Very Good Care -  We want to provide very good care to all patients.  How was your care?     Call back COMMENTS: My care was very good.       Opportunities for Improvement -  Our goal is to be the best.  Do you have any suggestions for things that we could improve upon?     Call back COMMENTS: NO suggestions.      Thank You           "

## 2017-05-26 NOTE — MR AVS SNAPSHOT
"              After Visit Summary   5/26/2017    Last Phelps    MRN: [id number]           Patient Information     Date Of Birth          1950        Visit Information        Provider Department      5/26/2017 9:00 AM NL INFUSION CHAIR 2 [facility] Infusion Services        Today's Diagnoses     Streptococcal bacteremia    -  1    Severe sepsis with acute organ dysfunction due to Streptococcus species (H)        Cellulitis of right leg          Care Instructions    Pt to return on 5/27/17  for Rocephin via PICC. Copies of medication list and upcoming appointments given prior to discharge.            Follow-ups after your visit        Follow-up notes from your care team     Return in about 1 day (around 5/27/2017).      Who to contact     If you have questions or need follow up information about today's clinic visit or your schedule please contact [facility] INFUSION SERVICES directly at [phone].  Normal or non-critical lab and imaging results will be communicated to you by Sidekick Gameshart, letter or phone within 4 business days after the clinic has received the results. If you do not hear from us within 7 days, please contact the clinic through Sidekick Gameshart or phone. If you have a critical or abnormal lab result, we will notify you by phone as soon as possible.  Submit refill requests through Tripvisto or call your pharmacy and they will forward the refill request to us. Please allow 3 business days for your refill to be completed.          Additional Information About Your Visit        MyChart Information     Tripvisto lets you send messages to your doctor, view your test results, renew your prescriptions, schedule appointments and more. To sign up, go to [email or web address] . Click on \"Log in\" on the left side of the screen, which will take you to the Welcome page. Then click on \"Sign up Now\" on the right side of the page.     You will be asked to enter the access code listed below, as well as " some personal information. Please follow the directions to create your username and password.     Your access code is: [email or web address]  Expires: 2017 11:35 AM     Your access code will  in 90 days. If you need help or a new code, please call your [place] clinic or [phone].        Care EveryWhere ID     This is your Care EveryWhere ID. This could be used by other organizations to access your [place] medical records  [id number]        Your Vitals Were     Pulse Temperature Respirations             73 96.7  F (35.9  C) (Temporal) 20          Blood Pressure from Last 3 Encounters:   17 114/61   17 143/86   17 (!) 168/97    Weight from Last 3 Encounters:   17 (!) 155 kg (341 lb 11.4 oz)              Today, you had the following     No orders found for display       Primary Care Provider Office Phone # Fax #    Christofer Buckley [phone]       [facility] [address] [address]        Thank you!     Thank you for choosing [facility] INFUSION SERVICES  for your care. Our goal is always to provide you with excellent care. Hearing back from our patients is one way we can continue to improve our services. Please take a few minutes to complete the written survey that you may receive in the mail after your visit with us. Thank you!             Your Updated Medication List - Protect others around you: Learn how to safely use, store and throw away your medicines at www.disposemymeds.org.          This list is accurate as of: 17 10:21 AM.  Always use your most recent med list.                   Brand Name Dispense Instructions for use    ASPIRIN PO      Take 81 mg by mouth daily       cefTRIAXone 2 GM vial    ROCEPHIN    140 mL    Inject 2 g into the vein every 24 hours       COREG PO      Take 3.125 mg by mouth 2 times daily (with meals)       furosemide 20 MG tablet    LASIX    30 tablet    Take 1 tablet (20 mg) by mouth 2 times daily        GLIPIZIDE PO      Take 5 mg by mouth 2 times daily (before meals)       GLUCOPHAGE PO      Take 500 mg by mouth 2 times daily (with meals)       HYDROcodone-acetaminophen 5-325 MG per tablet    NORCO    40 tablet    Take 2 tablets by mouth every 6 hours as needed for moderate to severe pain       LISINOPRIL PO      Take 10 mg by mouth daily       LOPRESSOR PO      Take 50 mg by mouth 2 times daily       MEVACOR PO      Take 20 mg by mouth At Bedtime       warfarin 5 MG tablet    COUMADIN    30 tablet    No dose on 5/22/17, then take by mouth 2.5 mg (1/2 tablet) on Wednesdays and 5 mg (1 tablet) all other days or as directed by anticoagulation clinic RN

## 2017-05-27 ENCOUNTER — HOSPITAL ENCOUNTER (EMERGENCY)
Facility: CLINIC | Age: 67
Discharge: HOME | End: 2017-05-27
Admitting: PEDIATRICS
Payer: MEDICARE

## 2017-05-27 VITALS
SYSTOLIC BLOOD PRESSURE: 110 MMHG | OXYGEN SATURATION: 91 % | HEART RATE: 90 BPM | TEMPERATURE: 96.3 F | DIASTOLIC BLOOD PRESSURE: 68 MMHG | RESPIRATION RATE: 20 BRPM

## 2017-05-27 DIAGNOSIS — A40.9 SEVERE SEPSIS WITH ACUTE ORGAN DYSFUNCTION DUE TO STREPTOCOCCUS SPECIES (H): ICD-10-CM

## 2017-05-27 DIAGNOSIS — B95.5 STREPTOCOCCAL BACTEREMIA: ICD-10-CM

## 2017-05-27 DIAGNOSIS — R65.20 SEVERE SEPSIS WITH ACUTE ORGAN DYSFUNCTION DUE TO STREPTOCOCCUS SPECIES (H): ICD-10-CM

## 2017-05-27 DIAGNOSIS — L03.115 CELLULITIS OF RIGHT LEG: ICD-10-CM

## 2017-05-27 DIAGNOSIS — R78.81 STREPTOCOCCAL BACTEREMIA: ICD-10-CM

## 2017-05-27 PROCEDURE — 96365 THER/PROPH/DIAG IV INF INIT: CPT

## 2017-05-27 PROCEDURE — 25000128 H RX IP 250 OP 636: Performed by: PEDIATRICS

## 2017-05-27 PROCEDURE — 40000268 ZZH STATISTIC NO CHARGES

## 2017-05-27 RX ORDER — CEFTRIAXONE 2 G/1
2 INJECTION, POWDER, FOR SOLUTION INTRAMUSCULAR; INTRAVENOUS EVERY 24 HOURS
Status: CANCELLED | OUTPATIENT
Start: 2017-05-28

## 2017-05-27 RX ORDER — CEFTRIAXONE 2 G/1
2 INJECTION, POWDER, FOR SOLUTION INTRAMUSCULAR; INTRAVENOUS EVERY 24 HOURS
Status: DISCONTINUED | OUTPATIENT
Start: 2017-05-27 | End: 2017-05-27 | Stop reason: HOSPADM

## 2017-05-27 RX ADMIN — CEFTRIAXONE 2 G: 2 INJECTION, POWDER, FOR SOLUTION INTRAMUSCULAR; INTRAVENOUS at 10:01

## 2017-05-27 RX ADMIN — SODIUM CHLORIDE 250 ML: 9 INJECTION, SOLUTION INTRAVENOUS at 10:01

## 2017-05-27 NOTE — ED AVS SNAPSHOT
[facility] Emergency Department    [address] [place] [address]    Phone:  [phone]    Fax:  [phone]                                       Last Phelps   MRN: [id number]    Department:  [facility] Emergency Department   Date of Visit:  5/27/2017           After Visit Summary Signature Page     I have received my discharge instructions, and my questions have been answered. I have discussed any challenges I see with this plan with the nurse or doctor.    ..........................................................................................................................................  Patient/Patient Representative Signature      ..........................................................................................................................................  Patient Representative Print Name and Relationship to Patient    ..................................................               ................................................  Date                                            Time    ..........................................................................................................................................  Reviewed by Signature/Title    ...................................................              ..............................................  Date                                                            Time

## 2017-05-27 NOTE — ED AVS SNAPSHOT
[facility] Emergency Department    [address] [place] [address]    Phone:  [phone]    Fax:  [phone]                                       Last Phelps   MRN: [id number]    Department:  [facility] Emergency Department   Date of Visit:  5/27/2017           Patient Information     Date Of Birth          1950        Your diagnoses for this visit were:     Streptococcal bacteremia     Severe sepsis with acute organ dysfunction due to Streptococcus species (H)     Cellulitis of right leg       24 Hour Appointment Hotline       To make an appointment at any [place] clinic, call [phone] ([phone]). If you don't have a family doctor or clinic, we will help you find one. [place] clinics are conveniently located to serve the needs of you and your family.             Review of your medicines      Our records show that you are taking the medicines listed below. If these are incorrect, please call your family doctor or clinic.        Dose / Directions Last dose taken    ASPIRIN PO   Dose:  81 mg        Take 81 mg by mouth daily   Refills:  0        cefTRIAXone 2 GM vial   Commonly known as:  ROCEPHIN   Dose:  2 g   Indication:  Skin and Soft Tissue Infection   Quantity:  140 mL        Inject 2 g into the vein every 24 hours   Refills:  0        COREG PO   Dose:  3.125 mg        Take 3.125 mg by mouth 2 times daily (with meals)   Refills:  0        furosemide 20 MG tablet   Commonly known as:  LASIX   Dose:  20 mg   Quantity:  30 tablet        Take 1 tablet (20 mg) by mouth 2 times daily   Refills:  0        GLIPIZIDE PO   Dose:  5 mg        Take 5 mg by mouth 2 times daily (before meals)   Refills:  0        GLUCOPHAGE PO   Dose:  500 mg        Take 500 mg by mouth 2 times daily (with meals)   Refills:  0        HYDROcodone-acetaminophen 5-325 MG per tablet   Commonly known as:  NORCO   Dose:  2 tablet   Quantity:  40 tablet        Take 2 tablets by mouth every 6  "hours as needed for moderate to severe pain   Refills:  0        LISINOPRIL PO   Dose:  10 mg        Take 10 mg by mouth daily   Refills:  0        LOPRESSOR PO   Dose:  50 mg        Take 50 mg by mouth 2 times daily   Refills:  0        MEVACOR PO   Dose:  20 mg        Take 20 mg by mouth At Bedtime   Refills:  0        warfarin 5 MG tablet   Commonly known as:  COUMADIN   Quantity:  30 tablet        No dose on 5/22/17, then take by mouth 2.5 mg (1/2 tablet) on Wednesdays and 5 mg (1 tablet) all other days or as directed by anticoagulation clinic RN   Refills:  0                Orders Needing Specimen Collection     None      Pending Results     No orders found from 5/25/2017 to 5/28/2017.            Pending Culture Results     No orders found from 5/25/2017 to 5/28/2017.            Pending Results Instructions     If you had any lab results that were not finalized at the time of your Discharge, you can call the ED Lab Result RN at [phone]. You will be contacted by this team for any positive Lab results or changes in treatment. The nurses are available 7 days a week from 10A to 6:30P.  You can leave a message 24 hours per day and they will return your call.        Thank you for choosing [facility]       Thank you for choosing [facility] for your care. Our goal is always to provide you with excellent care. Hearing back from our patients is one way we can continue to improve our services. Please take a few minutes to complete the written survey that you may receive in the mail after you visit with us. Thank you!        ClearMyMail Information     ClearMyMail lets you send messages to your doctor, view your test results, renew your prescriptions, schedule appointments and more. To sign up, go to [email or web address] . Click on \"Log in\" on the left side of the screen, which will take you to the Welcome page. Then click on \"Sign up Now\" on the right side of the page.     You will be asked to enter the access code listed " below, as well as some personal information. Please follow the directions to create your username and password.     Your access code is: [email or web address]  Expires: 2017 11:35 AM     Your access code will  in 90 days. If you need help or a new code, please call your [place] clinic or [phone].        Care EveryWhere ID     This is your Care EveryWhere ID. This could be used by other organizations to access your [place] medical records  [id number]        After Visit Summary       This is your record. Keep this with you and show to your community pharmacist(s) and doctor(s) at your next visit.

## 2017-05-28 ENCOUNTER — HOSPITAL ENCOUNTER (EMERGENCY)
Facility: CLINIC | Age: 67
Discharge: HOME | End: 2017-05-28
Admitting: EMERGENCY MEDICINE
Payer: MEDICARE

## 2017-05-28 VITALS
OXYGEN SATURATION: 92 % | HEIGHT: 73 IN | TEMPERATURE: 97.4 F | SYSTOLIC BLOOD PRESSURE: 108 MMHG | RESPIRATION RATE: 24 BRPM | DIASTOLIC BLOOD PRESSURE: 71 MMHG

## 2017-05-28 DIAGNOSIS — R78.81 STREPTOCOCCAL BACTEREMIA: ICD-10-CM

## 2017-05-28 DIAGNOSIS — B95.5 STREPTOCOCCAL BACTEREMIA: ICD-10-CM

## 2017-05-28 DIAGNOSIS — A40.9 SEVERE SEPSIS WITH ACUTE ORGAN DYSFUNCTION DUE TO STREPTOCOCCUS SPECIES (H): ICD-10-CM

## 2017-05-28 DIAGNOSIS — L03.115 CELLULITIS OF RIGHT LEG: ICD-10-CM

## 2017-05-28 DIAGNOSIS — R65.20 SEVERE SEPSIS WITH ACUTE ORGAN DYSFUNCTION DUE TO STREPTOCOCCUS SPECIES (H): ICD-10-CM

## 2017-05-28 PROCEDURE — 40000268 ZZH STATISTIC NO CHARGES: Performed by: EMERGENCY MEDICINE

## 2017-05-28 PROCEDURE — 25000128 H RX IP 250 OP 636: Performed by: PEDIATRICS

## 2017-05-28 PROCEDURE — 96365 THER/PROPH/DIAG IV INF INIT: CPT | Performed by: EMERGENCY MEDICINE

## 2017-05-28 RX ORDER — CEFTRIAXONE 2 G/1
2 INJECTION, POWDER, FOR SOLUTION INTRAMUSCULAR; INTRAVENOUS EVERY 24 HOURS
Status: CANCELLED | OUTPATIENT
Start: 2017-05-29

## 2017-05-28 RX ORDER — CEFTRIAXONE 2 G/1
2 INJECTION, POWDER, FOR SOLUTION INTRAMUSCULAR; INTRAVENOUS EVERY 24 HOURS
Status: DISCONTINUED | OUTPATIENT
Start: 2017-05-29 | End: 2017-05-28 | Stop reason: HOSPADM

## 2017-05-28 RX ADMIN — SODIUM CHLORIDE 250 ML: 9 INJECTION, SOLUTION INTRAVENOUS at 17:36

## 2017-05-28 RX ADMIN — CEFTRIAXONE 2 G: 2 INJECTION, POWDER, FOR SOLUTION INTRAMUSCULAR; INTRAVENOUS at 17:36

## 2022-10-26 NOTE — PHARMACY-ANTICOAGULATION SERVICE
Clinical Pharmacy - Warfarin Dosing Consult     Pharmacy has been consulted to manage this patient s warfarin therapy.  Indication: Atrial Fibrillation  Therapy Goal: INR 2-3  Warfarin Prior to Admission: Yes  Warfarin PTA Regimen: 5mg daily per patient    INR   Date Value Ref Range Status   05/17/2017 2.30 (H) 0.86 - 1.14 Final   05/16/2017 3.44 (H) 0.86 - 1.14 Final       Recommend warfarin 5 mg today.  Pharmacy will monitor Last Phelps daily and order warfarin doses to achieve specified goal.      Please contact pharmacy as soon as possible if the warfarin needs to be held for a procedure or if the warfarin goals change.       room air
